# Patient Record
Sex: FEMALE | Race: WHITE | Employment: OTHER | URBAN - METROPOLITAN AREA
[De-identification: names, ages, dates, MRNs, and addresses within clinical notes are randomized per-mention and may not be internally consistent; named-entity substitution may affect disease eponyms.]

---

## 2020-02-06 ENCOUNTER — APPOINTMENT (OUTPATIENT)
Dept: CT IMAGING | Facility: CLINIC | Age: 85
End: 2020-02-06
Attending: EMERGENCY MEDICINE
Payer: COMMERCIAL

## 2020-02-06 ENCOUNTER — APPOINTMENT (OUTPATIENT)
Dept: GENERAL RADIOLOGY | Facility: CLINIC | Age: 85
End: 2020-02-06
Attending: EMERGENCY MEDICINE
Payer: COMMERCIAL

## 2020-02-06 ENCOUNTER — HOSPITAL ENCOUNTER (EMERGENCY)
Facility: CLINIC | Age: 85
Discharge: HOME | End: 2020-02-06
Attending: EMERGENCY MEDICINE | Admitting: EMERGENCY MEDICINE
Payer: COMMERCIAL

## 2020-02-06 VITALS
SYSTOLIC BLOOD PRESSURE: 160 MMHG | OXYGEN SATURATION: 96 % | TEMPERATURE: 97.4 F | HEART RATE: 79 BPM | RESPIRATION RATE: 16 BRPM | DIASTOLIC BLOOD PRESSURE: 89 MMHG

## 2020-02-06 DIAGNOSIS — S01.01XA SCALP LACERATION, INITIAL ENCOUNTER: ICD-10-CM

## 2020-02-06 DIAGNOSIS — R55 NEAR SYNCOPE: ICD-10-CM
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
## 2020-02-06 LAB
ANION GAP SERPL CALCULATED.3IONS-SCNC: 8 MMOL/L (ref 3–14)
BASOPHILS # BLD AUTO: 0 10E9/L (ref 0–0.2)
BASOPHILS NFR BLD AUTO: 0.5 %
BUN SERPL-MCNC: 12 MG/DL (ref 7–30)
CALCIUM SERPL-MCNC: 8.5 MG/DL (ref 8.5–10.1)
CHLORIDE SERPL-SCNC: 101 MMOL/L (ref 94–109)
CO2 SERPL-SCNC: 24 MMOL/L (ref 20–32)
CREAT SERPL-MCNC: 1.01 MG/DL (ref 0.52–1.04)
DIFFERENTIAL METHOD BLD: NORMAL
EOSINOPHIL # BLD AUTO: 0.1 10E9/L (ref 0–0.7)
EOSINOPHIL NFR BLD AUTO: 1.1 %
ERYTHROCYTE [DISTWIDTH] IN BLOOD BY AUTOMATED COUNT: 12.6 % (ref 10–15)
GFR SERPL CREATININE-BSD FRML MDRD: 52 ML/MIN/{1.73_M2}
GLUCOSE SERPL-MCNC: 100 MG/DL (ref 70–99)
HCT VFR BLD AUTO: 38.8 % (ref 35–47)
HGB BLD-MCNC: 12.3 G/DL (ref 11.7–15.7)
IMM GRANULOCYTES # BLD: 0 10E9/L (ref 0–0.4)
IMM GRANULOCYTES NFR BLD: 0.4 %
LYMPHOCYTES # BLD AUTO: 2.4 10E9/L (ref 0.8–5.3)
LYMPHOCYTES NFR BLD AUTO: 29.1 %
MCH RBC QN AUTO: 30.7 PG (ref 26.5–33)
MCHC RBC AUTO-ENTMCNC: 31.7 G/DL (ref 31.5–36.5)
MCV RBC AUTO: 97 FL (ref 78–100)
MONOCYTES # BLD AUTO: 0.7 10E9/L (ref 0–1.3)
MONOCYTES NFR BLD AUTO: 7.9 %
NEUTROPHILS # BLD AUTO: 5.1 10E9/L (ref 1.6–8.3)
NEUTROPHILS NFR BLD AUTO: 61 %
NRBC # BLD AUTO: 0 10*3/UL
NRBC BLD AUTO-RTO: 0 /100
PLATELET # BLD AUTO: 308 10E9/L (ref 150–450)
POTASSIUM SERPL-SCNC: 3.5 MMOL/L (ref 3.4–5.3)
RBC # BLD AUTO: 4.01 10E12/L (ref 3.8–5.2)
SODIUM SERPL-SCNC: 133 MMOL/L (ref 133–144)
TROPONIN I SERPL-MCNC: <0.015 UG/L (ref 0–0.04)
TROPONIN I SERPL-MCNC: <0.015 UG/L (ref 0–0.04)
WBC # BLD AUTO: 8.3 10E9/L (ref 4–11)

## 2020-02-06 PROCEDURE — 71046 X-RAY EXAM CHEST 2 VIEWS: CPT

## 2020-02-06 PROCEDURE — 25000132 ZZH RX MED GY IP 250 OP 250 PS 637: Performed by: EMERGENCY MEDICINE

## 2020-02-06 PROCEDURE — 93005 ELECTROCARDIOGRAM TRACING: CPT | Mod: 59

## 2020-02-06 PROCEDURE — 80048 BASIC METABOLIC PNL TOTAL CA: CPT | Performed by: EMERGENCY MEDICINE

## 2020-02-06 PROCEDURE — 85025 COMPLETE CBC W/AUTO DIFF WBC: CPT | Performed by: EMERGENCY MEDICINE

## 2020-02-06 PROCEDURE — 12001 RPR S/N/AX/GEN/TRNK 2.5CM/<: CPT

## 2020-02-06 PROCEDURE — 84484 ASSAY OF TROPONIN QUANT: CPT | Mod: 91 | Performed by: EMERGENCY MEDICINE

## 2020-02-06 PROCEDURE — 72125 CT NECK SPINE W/O DYE: CPT

## 2020-02-06 PROCEDURE — 99285 EMERGENCY DEPT VISIT HI MDM: CPT | Mod: 25

## 2020-02-06 PROCEDURE — 70450 CT HEAD/BRAIN W/O DYE: CPT

## 2020-02-06 RX ORDER — ACETAMINOPHEN 325 MG/1
650 TABLET ORAL ONCE
Status: COMPLETED | OUTPATIENT
Start: 2020-02-06 | End: 2020-02-06

## 2020-02-06 RX ORDER — BUPIVACAINE HYDROCHLORIDE 5 MG/ML
INJECTION, SOLUTION PERINEURAL
Status: DISCONTINUED
Start: 2020-02-06 | End: 2020-02-06 | Stop reason: HOSPADM

## 2020-02-06 RX ADMIN — ACETAMINOPHEN 650 MG: 325 TABLET, FILM COATED ORAL at 17:15

## 2020-02-06 ASSESSMENT — ENCOUNTER SYMPTOMS: WOUND: 1

## 2020-02-06 NOTE — ED TRIAGE NOTES
Had a few beers this morning.  Stood up from a chair, fell and struck the back of her head on the tile floor.  Large laceration to back of head.  Dressing applied at triage.  ABCDs Intact.

## 2020-02-06 NOTE — ED AVS SNAPSHOT
[facility] Emergency Department  [address]  [facility] [address]  Phone:  [phone]  Fax:  [phone]                                    Hedy English   MRN: [id number]    Department:  [facility] Emergency Department   Date of Visit:  2/6/2020           After Visit Summary Signature Page    I have received my discharge instructions, and my questions have been answered. I have discussed any challenges I see with this plan with the nurse or doctor.    ..........................................................................................................................................  Patient/Patient Representative Signature      ..........................................................................................................................................  Patient Representative Print Name and Relationship to Patient    ..................................................               ................................................  Date                                   Time    ..........................................................................................................................................  Reviewed by Signature/Title    ...................................................              ..............................................  Date                                               Time          22EPIC Rev 08/18

## 2020-02-06 NOTE — ED NOTES
"Pt on call light complaining of \"feeling like my breathing is labored.\" Lung sounds clear. O2 sats >95%. MD lowery and chest xray ordered by MD Cabello.   "

## 2020-02-06 NOTE — ED PROVIDER NOTES
History     Chief Complaint:  Fall and Head Laceration     HPI   Hedy English is a 84 year old female who presents after a fall and with a head laceration. The patient reported that she was at the [employer] having a few beers with her granddaughter this morning. The patient then stood up from her chair took a few steps forward and then fell backwards striking the back of her head on the tile floor causing a laceration to the back of her head. With concern for concussion and to close the wound, the patient was brought to the ED for evaluation.     Allergies:  The patient has no known drug allergies.    Medications:    Paxil      Past Medical History:    The patient denies any significant past medical history.    Past Surgical History:    The patient does not have any pertinent past surgical history.    Family History:    No past pertinent family history.    Social History:  Alcohol use endorsed   Negative for drug use.  Marital Status:   [2]     Review of Systems   Skin: Positive for wound.   All other systems reviewed and are negative.        Physical Exam     Patient Vitals for the past 24 hrs:   BP Temp Temp src Pulse Resp SpO2   02/06/20 1745 (!) 160/89 -- -- 79 -- --   02/06/20 1730 (!) 168/94 -- -- 80 -- --   02/06/20 1715 -- -- -- -- -- 96 %   02/06/20 1700 (!) 170/86 -- -- 76 -- 97 %   02/06/20 1645 (!) 144/77 -- -- 71 -- --   02/06/20 1615 133/69 -- -- 74 -- 95 %   02/06/20 1600 138/66 -- -- 74 -- 93 %   02/06/20 1545 136/75 -- -- 70 -- 93 %   02/06/20 1530 135/72 -- -- 75 -- 93 %   02/06/20 1515 133/75 -- -- 74 -- 95 %   02/06/20 1500 (!) 140/80 -- -- 73 -- 94 %   02/06/20 1445 (!) 153/83 -- -- 74 -- 95 %   02/06/20 1430 (!) 164/97 97.4  F (36.3  C) Oral 74 16 98 %       Physical Exam  Constitutional: Patient is well appearing. No distress.  Head: No bogginess or deformity.  See below  Mouth/Throat: Oropharynx is clear and moist. No oropharyngeal exudate.  Eyes: Conjunctivae and EOM  are normal. No scleral icterus.  Neck: Normal range of motion. Neck supple.   Cardiovascular: Normal rate, regular rhythm, normal heart sounds and intact distal pulses.   Pulmonary/Chest: Breath sounds normal. No respiratory distress.  Abdominal: Soft. Bowel sounds are normal. No distension. No tenderness. No rebound or guarding.   Musculoskeletal: Normal range of motion. No edema or tenderness.   Neurological: Alert and orientated to person, place, and time. No observable focal neuro deficit  Skin: Warm and dry. No rash noted. Not diaphoretic. 2.2 cm vertical laceration to the apex of the scalp.       Emergency Department Course   ECG:  Indication: Fall  Time: 1443  Vent. Rate 73 bpm. TX interval 210. QRS duration 100. QT/QTc 430/473. P-R-T axis 30 -15 75.  Sinus rhythm with 1st degree AV block. Incomplete right bundle branch block. Borderline ECG. Read time: 1444    Imaging:  Radiographic findings were communicated with the patient who voiced understanding of the findings.    CT Head without contrast:   1. No evidence of intracranial trauma.  2. Atrophy of the brain. White matter changes consistent with sequelae  of small vessel ischemic disease.  3. Right parietooccipital scalp soft tissue swelling and laceration  with skin staples, as per radiology.    CT Cervical Spine without contrast:   Degenerative changes. No evidence of acute trauma, as per radiology.    XR Chest PA & LAT:   No radiographic evidence of acute chest abnormality, as per radiology.       Laboratory:  CBC: WBC: 8.3, HGB: 12.3, PLT: 308  BMP: Glucose 100 (H), GFR: 52 (L), o/w WNL (Creatinine: 1.01)    1455 Troponin: <0.015  1709 Troponin: <0.015      Procedures:    Laceration Repair        LACERATION:  A simple and superficial clean 2.2 cm laceration.      LOCATION:  [place] of scalp       FUNCTION:  Distally sensation, circulation, motor and tendon function are intact.      ANESTHESIA:  Local using bupivicaine      PREPARATION:  Irrigation and  Scrubbing with Normal Saline and Shur Clens      DEBRIDEMENT:  no debridement      CLOSURE:  Wound was closed with 4 Staples    Interventions:  1715 Tylenol 650 mg PO    Emergency Department Course:  Nursing notes and vitals reviewed. (1441) I performed an exam of the patient as documented above.     IV inserted. Medicine administered as documented above. Blood drawn. This was sent to the lab for further testing, results above.    The patient was sent for head CT, C spine CT, and chest Xr while in the emergency department, findings above.   EKG obtained in the ED, see results above.     I rechecked the patient and performed the laceration repair as noted above.   (1711) I reevaluated the patient and provided an update in regards to her ED course.      Findings and plan explained to the Patient. Patient discharged home with instructions regarding supportive care, medications, and reasons to return. The importance of close follow-up was reviewed.     I personally reviewed the laboratory results with the Patient and answered all related questions prior to discharge.       Impression & Plan    Medical Decision Making:  This patient presents after a fall from standing, and has a history/exam consistent with a closed head injury with laceration. Differential diagnosis is broad, and it includes skull fracture, epidural hematoma, subdural hematoma, intracerebral hemorrhage, and traumatic subarachnoid hemorrhage, all of which are highly unlikely in this clinical setting. This patient denies severe headache, seizure, and has no focal neurological findings. The patient did not have  LOC, sleepiness, repeated emesis, poor orientation, or significant irritability. CT head as above    The patient's laceration was cleaned, evaluated, and repaired, as noted above. Risks/signs of infection and scarring have been discussed with the patient and family. Staples should be removed in 5 days to limit the chances of scarring. The patient  will watch for signs of infection, including erythema surrounding the wound, streaking, purulent drainage, or fever/chills.     Additionally vasovagal most likely post 6 beers and rapidly standing up however an extensive workup here for other neuro CV resp causes neg as above.      All questions and concerns were answered. The patient was discharged home and recommended to follow up with her primary physician and return with any new or worsening symptoms.     Diagnosis:    ICD-10-CM    1. Fall, initial encounter W19.XXXA Troponin I   2. Near syncope R55    3. Scalp laceration, initial encounter S01.01XA        Disposition:  discharged to home    Scribe Disclosure:  Nohemy KEBEDE, am serving as a scribe on 2/6/2020 at 2:41 PM to personally document services performed by Rob Cabello MD based on my observations and the provider's statements to me.     Nohemy Lechuga  2/6/2020   [facility] EMERGENCY DEPARTMENT       Rob Cabello MD  02/06/20 6750

## 2020-02-07 LAB — INTERPRETATION ECG - MUSE: NORMAL

## 2020-09-20 ENCOUNTER — APPOINTMENT (OUTPATIENT)
Dept: CT IMAGING | Facility: CLINIC | Age: 85
End: 2020-09-20
Attending: EMERGENCY MEDICINE
Payer: COMMERCIAL

## 2020-09-20 ENCOUNTER — APPOINTMENT (OUTPATIENT)
Dept: GENERAL RADIOLOGY | Facility: CLINIC | Age: 85
End: 2020-09-20
Attending: EMERGENCY MEDICINE
Payer: COMMERCIAL

## 2020-09-20 ENCOUNTER — HOSPITAL ENCOUNTER (EMERGENCY)
Facility: CLINIC | Age: 85
Discharge: HOME | End: 2020-09-20
Attending: EMERGENCY MEDICINE | Admitting: EMERGENCY MEDICINE
Payer: COMMERCIAL

## 2020-09-20 VITALS
OXYGEN SATURATION: 97 % | RESPIRATION RATE: 19 BRPM | SYSTOLIC BLOOD PRESSURE: 198 MMHG | HEART RATE: 84 BPM | DIASTOLIC BLOOD PRESSURE: 105 MMHG | TEMPERATURE: 98.5 F

## 2020-09-20 DIAGNOSIS — I10 HYPERTENSION, UNSPECIFIED TYPE: ICD-10-CM

## 2020-09-20 DIAGNOSIS — R06.02 SHORTNESS OF BREATH: ICD-10-CM

## 2020-09-20 LAB
ANION GAP SERPL CALCULATED.3IONS-SCNC: 7 MMOL/L (ref 3–14)
BASOPHILS # BLD AUTO: 0.1 10E9/L (ref 0–0.2)
BASOPHILS NFR BLD AUTO: 0.6 %
BUN SERPL-MCNC: 25 MG/DL (ref 7–30)
CALCIUM SERPL-MCNC: 9.6 MG/DL (ref 8.5–10.1)
CHLORIDE SERPL-SCNC: 104 MMOL/L (ref 94–109)
CO2 SERPL-SCNC: 25 MMOL/L (ref 20–32)
CREAT SERPL-MCNC: 0.88 MG/DL (ref 0.52–1.04)
D DIMER PPP FEU-MCNC: 1.1 UG/ML FEU (ref 0–0.5)
DIFFERENTIAL METHOD BLD: ABNORMAL
EOSINOPHIL # BLD AUTO: 0.1 10E9/L (ref 0–0.7)
EOSINOPHIL NFR BLD AUTO: 1.3 %
ERYTHROCYTE [DISTWIDTH] IN BLOOD BY AUTOMATED COUNT: 13.2 % (ref 10–15)
GFR SERPL CREATININE-BSD FRML MDRD: 61 ML/MIN/{1.73_M2}
GLUCOSE SERPL-MCNC: 118 MG/DL (ref 70–99)
HCT VFR BLD AUTO: 38.7 % (ref 35–47)
HGB BLD-MCNC: 12.1 G/DL (ref 11.7–15.7)
IMM GRANULOCYTES # BLD: 0.1 10E9/L (ref 0–0.4)
IMM GRANULOCYTES NFR BLD: 0.5 %
LYMPHOCYTES # BLD AUTO: 2.7 10E9/L (ref 0.8–5.3)
LYMPHOCYTES NFR BLD AUTO: 25.1 %
MCH RBC QN AUTO: 30.1 PG (ref 26.5–33)
MCHC RBC AUTO-ENTMCNC: 31.3 G/DL (ref 31.5–36.5)
MCV RBC AUTO: 96 FL (ref 78–100)
MONOCYTES # BLD AUTO: 0.8 10E9/L (ref 0–1.3)
MONOCYTES NFR BLD AUTO: 7.8 %
NEUTROPHILS # BLD AUTO: 7 10E9/L (ref 1.6–8.3)
NEUTROPHILS NFR BLD AUTO: 64.7 %
NRBC # BLD AUTO: 0 10*3/UL
NRBC BLD AUTO-RTO: 0 /100
PLATELET # BLD AUTO: 358 10E9/L (ref 150–450)
POTASSIUM SERPL-SCNC: 3.9 MMOL/L (ref 3.4–5.3)
RBC # BLD AUTO: 4.02 10E12/L (ref 3.8–5.2)
SODIUM SERPL-SCNC: 136 MMOL/L (ref 133–144)
TROPONIN I SERPL-MCNC: <0.015 UG/L (ref 0–0.04)
WBC # BLD AUTO: 10.8 10E9/L (ref 4–11)

## 2020-09-20 PROCEDURE — 71045 X-RAY EXAM CHEST 1 VIEW: CPT

## 2020-09-20 PROCEDURE — 80048 BASIC METABOLIC PNL TOTAL CA: CPT | Performed by: EMERGENCY MEDICINE

## 2020-09-20 PROCEDURE — 96374 THER/PROPH/DIAG INJ IV PUSH: CPT | Mod: 59

## 2020-09-20 PROCEDURE — 25000128 H RX IP 250 OP 636: Performed by: EMERGENCY MEDICINE

## 2020-09-20 PROCEDURE — 71275 CT ANGIOGRAPHY CHEST: CPT

## 2020-09-20 PROCEDURE — 99285 EMERGENCY DEPT VISIT HI MDM: CPT | Mod: 25

## 2020-09-20 PROCEDURE — 25000132 ZZH RX MED GY IP 250 OP 250 PS 637: Performed by: EMERGENCY MEDICINE

## 2020-09-20 PROCEDURE — 84484 ASSAY OF TROPONIN QUANT: CPT | Performed by: EMERGENCY MEDICINE

## 2020-09-20 PROCEDURE — 93005 ELECTROCARDIOGRAM TRACING: CPT

## 2020-09-20 PROCEDURE — 85379 FIBRIN DEGRADATION QUANT: CPT | Performed by: EMERGENCY MEDICINE

## 2020-09-20 PROCEDURE — 85025 COMPLETE CBC W/AUTO DIFF WBC: CPT | Performed by: EMERGENCY MEDICINE

## 2020-09-20 RX ORDER — HYDROCHLOROTHIAZIDE 12.5 MG/1
12.5 TABLET ORAL DAILY
Qty: 30 TABLET | Refills: 0 | Status: SHIPPED | OUTPATIENT
Start: 2020-09-20 | End: 2021-09-06

## 2020-09-20 RX ORDER — HYDRALAZINE HYDROCHLORIDE 20 MG/ML
5 INJECTION INTRAMUSCULAR; INTRAVENOUS ONCE
Status: COMPLETED | OUTPATIENT
Start: 2020-09-20 | End: 2020-09-20

## 2020-09-20 RX ORDER — HYDROCHLOROTHIAZIDE 12.5 MG/1
12.5 CAPSULE ORAL ONCE
Status: COMPLETED | OUTPATIENT
Start: 2020-09-20 | End: 2020-09-20

## 2020-09-20 RX ORDER — IOPAMIDOL 755 MG/ML
60 INJECTION, SOLUTION INTRAVASCULAR ONCE
Status: COMPLETED | OUTPATIENT
Start: 2020-09-20 | End: 2020-09-20

## 2020-09-20 RX ADMIN — HYDRALAZINE HYDROCHLORIDE 5 MG: 20 INJECTION INTRAMUSCULAR; INTRAVENOUS at 18:55

## 2020-09-20 RX ADMIN — IOPAMIDOL 60 ML: 755 INJECTION, SOLUTION INTRAVENOUS at 18:18

## 2020-09-20 RX ADMIN — HYDROCHLOROTHIAZIDE 12.5 MG: 12.5 CAPSULE ORAL at 17:39

## 2020-09-20 NOTE — ED AVS SNAPSHOT
[facility] Emergency Department  [address]  [facility] [address]  Phone:  [phone]  Fax:  [phone]                                    Hedy English   MRN: [id number]    Department:  [facility] Emergency Department   Date of Visit:  9/20/2020           After Visit Summary Signature Page    I have received my discharge instructions, and my questions have been answered. I have discussed any challenges I see with this plan with the nurse or doctor.    ..........................................................................................................................................  Patient/Patient Representative Signature      ..........................................................................................................................................  Patient Representative Print Name and Relationship to Patient    ..................................................               ................................................  Date                                   Time    ..........................................................................................................................................  Reviewed by Signature/Title    ...................................................              ..............................................  Date                                               Time          22EPIC Rev 08/18

## 2020-09-20 NOTE — ED PROVIDER NOTES
History     Chief Complaint:  Shortness of Breath and Hypertension    HPI   Hedy English is a 84 year old female who presents with tautness of breath.  Patient reported that during the night she had a few episodes of shortness of breath.  She felt like she was sleeping and it was a dream.  She denies any chest pain.  She woke up and with her about her day with no symptoms.  She has never had this happen before.  She denies any current chest pain or leg swelling.  She does have a history of high blood pressure but does not take any antihypertensives as she was never prescribed them.  She denies any headaches.  She denies any fever or cough.    Allergies:  No Known Allergies     Medications:    Past medical history reviewed. No pertinent medical history.    Past Medical History:    Dyslipidemia  Glaucoma  Depression with anxiety  Diverticulitis of colon  Osteopenia  Hyperglycemia  Vitamin D deficiency  Cholelithiasis    Past Surgical History:    Cataract removal  Right eye glaucoma x2    Family History:    Father: Heart disease, Hypertension  Mother: Hypertension    Social History:  Smoking Status: Former Smoker  Smokeless Tobacco: Never Used  Alcohol Use: Positive  Drug Use: Negative  PCP: Sparkle Mcallister  Marital Status:       Review of Systems   Constitutional: Negative for fever.   Respiratory: Positive for shortness of breath. Negative for cough.    Cardiovascular: Negative for chest pain.   Neurological: Negative for headaches.   All other systems reviewed and are negative.    Physical Exam     Patient Vitals for the past 24 hrs:   BP Temp Pulse Resp SpO2   09/20/20 1955 -- -- 84 19 97 %   09/20/20 1954 -- -- 82 22 97 %   09/20/20 1945 (!) 198/105 -- 82 17 98 %   09/20/20 1933 (!) 181/106 -- 85 -- --   09/20/20 1930 (!) 192/105 -- 87 26 99 %   09/20/20 1915 -- -- 90 19 96 %   09/20/20 1900 (!) 199/100 -- 83 25 95 %   09/20/20 1845 (!) 205/102 -- 82 26 96 %   09/20/20 1745 (!) 225/105 -- 78 25 --    09/20/20 1730 (!) 219/106 -- 80 20 95 %   09/20/20 1715 (!) 211/111 -- 81 19 97 %   09/20/20 1700 (!) 206/111 -- 82 14 96 %   09/20/20 1645 (!) 219/109 -- 80 16 98 %   09/20/20 1630 (!) 215/110 -- -- -- --   09/20/20 1622 (!) 221/129 98.5  F (36.9  C) 86 16 99 %       Physical Exam  General: Patient is alert and interactive when I enter the room, well appearing   Head:  The scalp, face, and head appear normal  Eyes:  Conjunctivae are normal  ENT:    The nose is normal    Pinnae are normal    External acoustic canals are normal  Neck:  Trachea midline  CV:  Pulses are normal, RRR    Resp:  No respiratory distress, CTAB   Abdomen:      Soft, non-tender, non-distended  Musc:  Normal muscular tone    No major joint effusions    No asymmetric leg swelling    No lower extremity edema   Skin:  No rash or lesions noted  Neuro:  Speech is normal and fluent. Face is symmetric.     Moving all extremities well.   Psych: Awake. Alert.  Normal affect.  Appropriate interactions.      Emergency Department Course     ECG:  ECG taken at 1636  Sinus rhythm with Premature atrial complexes with Aberrant conduction  Otherwise normal ECG  When compared with ECG of 06-FEB-2020 14:43,  Aberrant conduction is now Present  Rate 81 bpm. SC interval 194 ms. QRS duration 98 ms. QT/QTc 404/469 ms. P-R-T axes 51 -27 54.    Imaging:  Radiology findings were communicated with the patient who voiced understanding of the findings.    XR Chest Port 1 View  No pleural fluid or pneumothorax. A retrocardiac opacity could be a confluence of soft tissues. No large consolidative opacity identified. No interstitial edema. The cardiomediastinal silhouette is normal in size. There is aortic   calcification.   Reading per radiology    CT Chest Pulmonary Embolism w Contrast  1.  No PE.  2.  Minimal nodularity in the right upper lobe is likely inflammatory change given adjacent mucous plugging. A 2 mm right lower lobe nodule may also be inflammatory change.  3.   Cardiomegaly.  4.  Atherosclerotic disease including coronary artery disease.   Reading per radiology    Laboratory:  Laboratory findings were communicated with the patient who voiced understanding of the findings.    CBC: WBC 10.8, HGB 12.1,   BMP: Glucose 118 (H) o/w WNL (Creatinine 0.88)    D dimer quantitative: 1.1 (H)    Troponin (Collected 1629): <0.015    Interventions:  1739 Microzide 12.5mg PO  1855 Apresoline 5mg IV    Emergency Department Course:    Past medical records, nursing notes, and vitals reviewed.  I performed an exam of the patient and obtained history, as documented above.     IV was inserted and blood was drawn for laboratory testing, results above.    The patient was sent for a CT and XR while in the emergency department, findings above    I rechecked and updated the patient.     I personally reviewed the laboratory and imaging results with the Patient and answered all related questions prior to discharge.     Findings and plan explained to the Patient. Patient discharged home with instructions regarding supportive care, medications, and reasons to return. The importance of close follow-up was reviewed.     Impression & Plan      Medical Decision Making:  Hedy English is a 84 year old female who presents to the emergency department today for evaluation of an episode of sob and HTN.  Patient presents with an episode of shortness of breath overnight.  She was sleeping and she is not sure if it was a dream.  She otherwise appears asymptomatic and is been fine all day.  Her EKG here was unremarkable.  Blood work here was unremarkable.  She is hypertensive which she admits that she has been told she has high blood pressure before but she is never been started on medications.  She is asymptomatic from this hypertension.  We did give her hydrochlorothiazide and hydralazine IV and her pressures did improve.  She started out to 20 and we are able to get her at 180 which I feel like is a  reasonable enough drop and I would not want a lower her blood pressure anymore.  She did have an elevated d-dimer so we did do a CT PE study but this revealed no acute abnormality.  I will start patient on hydrochlorothiazide and have her follow-up with her primary to get a blood pressure recheck.  She otherwise has no other symptoms so I think she is safe for discharge.  Return precautions given      Diagnosis:    ICD-10-CM    1. Shortness of breath  R06.02    2. Hypertension, unspecified type  I10        Disposition:   The patient is discharged to home.    Discharge Medications:  New Prescriptions    HYDROCHLOROTHIAZIDE (HYDRODIURIL) 12.5 MG TABLET    Take 1 tablet (12.5 mg) by mouth daily       Scribe Disclosure:  I, Bill Jama, am serving as a scribe at 4:41 PM on 9/20/2020 to document services personally performed by Lashae Amezcua MD based on my observations and the provider's statements to me.`   [facility] EMERGENCY DEPARTMENT       Lashae Amezcua MD  09/21/20 [id number]

## 2020-09-20 NOTE — ED TRIAGE NOTES
Sent to the ED from urgent care following an episode of SOB last night. Patient states is not currently feeling SOB. Was noted to be hypertensive at clinic with /100. Patient states is not on any antihypertensive medications.

## 2020-09-21 LAB — INTERPRETATION ECG - MUSE: NORMAL

## 2020-09-21 ASSESSMENT — ENCOUNTER SYMPTOMS
COUGH: 0
FEVER: 0
HEADACHES: 0
SHORTNESS OF BREATH: 1

## 2021-09-06 ENCOUNTER — APPOINTMENT (OUTPATIENT)
Dept: GENERAL RADIOLOGY | Facility: CLINIC | Age: 86
DRG: 689 | End: 2021-09-06
Attending: EMERGENCY MEDICINE
Payer: COMMERCIAL

## 2021-09-06 ENCOUNTER — HOSPITAL ENCOUNTER (INPATIENT)
Facility: CLINIC | Age: 86
LOS: 2 days | Discharge: NURSING, REHAB OR LONG-TERM CARE | DRG: 689 | End: 2021-09-08
Attending: EMERGENCY MEDICINE | Admitting: INTERNAL MEDICINE
Payer: COMMERCIAL

## 2021-09-06 DIAGNOSIS — N39.0 UTI (URINARY TRACT INFECTION) WITH PYURIA: ICD-10-CM

## 2021-09-06 DIAGNOSIS — R79.89 ELEVATED LACTIC ACID LEVEL: ICD-10-CM

## 2021-09-06 DIAGNOSIS — M62.81 GENERALIZED MUSCLE WEAKNESS: ICD-10-CM

## 2021-09-06 DIAGNOSIS — R94.31 ABNORMAL ELECTROCARDIOGRAM: ICD-10-CM

## 2021-09-06 LAB
ALBUMIN UR-MCNC: 200 MG/DL
ANION GAP SERPL CALCULATED.3IONS-SCNC: 9 MMOL/L (ref 3–14)
APPEARANCE UR: ABNORMAL
BACTERIA #/AREA URNS HPF: ABNORMAL /HPF
BASE EXCESS BLDV CALC-SCNC: 0 MMOL/L (ref -7.7–1.9)
BASOPHILS # BLD AUTO: 0 10E3/UL (ref 0–0.2)
BASOPHILS NFR BLD AUTO: 0 %
BILIRUB UR QL STRIP: ABNORMAL
BUN SERPL-MCNC: 20 MG/DL (ref 7–30)
CALCIUM SERPL-MCNC: 8.9 MG/DL (ref 8.5–10.1)
CHLORIDE BLD-SCNC: 104 MMOL/L (ref 94–109)
CO2 SERPL-SCNC: 24 MMOL/L (ref 20–32)
COLOR UR AUTO: ABNORMAL
CREAT SERPL-MCNC: 0.92 MG/DL (ref 0.52–1.04)
EOSINOPHIL # BLD AUTO: 0.1 10E3/UL (ref 0–0.7)
EOSINOPHIL NFR BLD AUTO: 1 %
ERYTHROCYTE [DISTWIDTH] IN BLOOD BY AUTOMATED COUNT: 19.4 % (ref 10–15)
GFR SERPL CREATININE-BSD FRML MDRD: 58 ML/MIN/1.73M2
GLUCOSE BLD-MCNC: 159 MG/DL (ref 70–99)
GLUCOSE UR STRIP-MCNC: NEGATIVE MG/DL
HCO3 BLDV-SCNC: 25 MMOL/L (ref 21–28)
HCT VFR BLD AUTO: 41.3 % (ref 35–47)
HGB BLD-MCNC: 13.7 G/DL (ref 11.7–15.7)
HGB UR QL STRIP: ABNORMAL
HOLD SPECIMEN: NORMAL
IMM GRANULOCYTES # BLD: 0.1 10E3/UL
IMM GRANULOCYTES NFR BLD: 1 %
KETONES UR STRIP-MCNC: ABNORMAL MG/DL
LACTATE SERPL-SCNC: 2.6 MMOL/L (ref 0.7–2)
LEUKOCYTE ESTERASE UR QL STRIP: ABNORMAL
LYMPHOCYTES # BLD AUTO: 1.8 10E3/UL (ref 0.8–5.3)
LYMPHOCYTES NFR BLD AUTO: 19 %
MAGNESIUM SERPL-MCNC: 1.7 MG/DL (ref 1.6–2.3)
MCH RBC QN AUTO: 32.4 PG (ref 26.5–33)
MCHC RBC AUTO-ENTMCNC: 33.2 G/DL (ref 31.5–36.5)
MCV RBC AUTO: 98 FL (ref 78–100)
MONOCYTES # BLD AUTO: 0.6 10E3/UL (ref 0–1.3)
MONOCYTES NFR BLD AUTO: 7 %
NEUTROPHILS # BLD AUTO: 6.7 10E3/UL (ref 1.6–8.3)
NEUTROPHILS NFR BLD AUTO: 72 %
NITRATE UR QL: NEGATIVE
NRBC # BLD AUTO: 0.1 10E3/UL
NRBC BLD AUTO-RTO: 1 /100
O2/TOTAL GAS SETTING VFR VENT: 0 %
PCO2 BLDV: 39 MM HG (ref 40–50)
PH BLDV: 7.41 [PH] (ref 7.32–7.43)
PH UR STRIP: 7.5 [PH] (ref 5–7)
PLATELET # BLD AUTO: 306 10E3/UL (ref 150–450)
PO2 BLDV: 15 MM HG (ref 25–47)
POTASSIUM BLD-SCNC: 4 MMOL/L (ref 3.4–5.3)
RBC # BLD AUTO: 4.23 10E6/UL (ref 3.8–5.2)
RBC URINE: 42 /HPF
SARS-COV-2 RNA RESP QL NAA+PROBE: NEGATIVE
SODIUM SERPL-SCNC: 137 MMOL/L (ref 133–144)
SP GR UR STRIP: 1.03 (ref 1–1.03)
TROPONIN I SERPL-MCNC: <0.015 UG/L (ref 0–0.04)
TSH SERPL DL<=0.005 MIU/L-ACNC: 1.81 MU/L (ref 0.4–4)
UROBILINOGEN UR STRIP-MCNC: 4 MG/DL
WBC # BLD AUTO: 9.5 10E3/UL (ref 4–11)
WBC URINE: >182 /HPF

## 2021-09-06 PROCEDURE — 96365 THER/PROPH/DIAG IV INF INIT: CPT

## 2021-09-06 PROCEDURE — 87635 SARS-COV-2 COVID-19 AMP PRB: CPT | Performed by: EMERGENCY MEDICINE

## 2021-09-06 PROCEDURE — 250N000011 HC RX IP 250 OP 636: Performed by: INTERNAL MEDICINE

## 2021-09-06 PROCEDURE — 120N000001 HC R&B MED SURG/OB

## 2021-09-06 PROCEDURE — 250N000011 HC RX IP 250 OP 636: Performed by: EMERGENCY MEDICINE

## 2021-09-06 PROCEDURE — 84443 ASSAY THYROID STIM HORMONE: CPT | Performed by: INTERNAL MEDICINE

## 2021-09-06 PROCEDURE — 99285 EMERGENCY DEPT VISIT HI MDM: CPT | Mod: 25

## 2021-09-06 PROCEDURE — 99223 1ST HOSP IP/OBS HIGH 75: CPT | Mod: AI | Performed by: INTERNAL MEDICINE

## 2021-09-06 PROCEDURE — 80048 BASIC METABOLIC PNL TOTAL CA: CPT | Performed by: EMERGENCY MEDICINE

## 2021-09-06 PROCEDURE — 36415 COLL VENOUS BLD VENIPUNCTURE: CPT | Performed by: EMERGENCY MEDICINE

## 2021-09-06 PROCEDURE — 83735 ASSAY OF MAGNESIUM: CPT | Performed by: EMERGENCY MEDICINE

## 2021-09-06 PROCEDURE — 81001 URINALYSIS AUTO W/SCOPE: CPT | Performed by: EMERGENCY MEDICINE

## 2021-09-06 PROCEDURE — 84484 ASSAY OF TROPONIN QUANT: CPT | Performed by: EMERGENCY MEDICINE

## 2021-09-06 PROCEDURE — C9803 HOPD COVID-19 SPEC COLLECT: HCPCS

## 2021-09-06 PROCEDURE — 250N000013 HC RX MED GY IP 250 OP 250 PS 637: Performed by: INTERNAL MEDICINE

## 2021-09-06 PROCEDURE — 82803 BLOOD GASES ANY COMBINATION: CPT | Performed by: EMERGENCY MEDICINE

## 2021-09-06 PROCEDURE — 93005 ELECTROCARDIOGRAM TRACING: CPT

## 2021-09-06 PROCEDURE — 87086 URINE CULTURE/COLONY COUNT: CPT | Performed by: INTERNAL MEDICINE

## 2021-09-06 PROCEDURE — 258N000003 HC RX IP 258 OP 636: Performed by: INTERNAL MEDICINE

## 2021-09-06 PROCEDURE — 87040 BLOOD CULTURE FOR BACTERIA: CPT | Performed by: EMERGENCY MEDICINE

## 2021-09-06 PROCEDURE — 250N000013 HC RX MED GY IP 250 OP 250 PS 637: Performed by: EMERGENCY MEDICINE

## 2021-09-06 PROCEDURE — 83605 ASSAY OF LACTIC ACID: CPT | Performed by: EMERGENCY MEDICINE

## 2021-09-06 PROCEDURE — 258N000003 HC RX IP 258 OP 636: Performed by: EMERGENCY MEDICINE

## 2021-09-06 PROCEDURE — 85025 COMPLETE CBC W/AUTO DIFF WBC: CPT | Performed by: EMERGENCY MEDICINE

## 2021-09-06 PROCEDURE — 71045 X-RAY EXAM CHEST 1 VIEW: CPT

## 2021-09-06 RX ORDER — AMOXICILLIN 250 MG
2 CAPSULE ORAL 2 TIMES DAILY PRN
Status: DISCONTINUED | OUTPATIENT
Start: 2021-09-06 | End: 2021-09-08 | Stop reason: HOSPADM

## 2021-09-06 RX ORDER — ONDANSETRON 4 MG/1
4 TABLET, ORALLY DISINTEGRATING ORAL EVERY 6 HOURS PRN
Status: DISCONTINUED | OUTPATIENT
Start: 2021-09-06 | End: 2021-09-08 | Stop reason: HOSPADM

## 2021-09-06 RX ORDER — CEFTRIAXONE 1 G/1
1 INJECTION, POWDER, FOR SOLUTION INTRAMUSCULAR; INTRAVENOUS ONCE
Status: COMPLETED | OUTPATIENT
Start: 2021-09-06 | End: 2021-09-06

## 2021-09-06 RX ORDER — AMOXICILLIN 250 MG
1 CAPSULE ORAL 2 TIMES DAILY PRN
Status: DISCONTINUED | OUTPATIENT
Start: 2021-09-06 | End: 2021-09-08 | Stop reason: HOSPADM

## 2021-09-06 RX ORDER — ACETAMINOPHEN 325 MG/1
650 TABLET ORAL EVERY 6 HOURS PRN
Status: DISCONTINUED | OUTPATIENT
Start: 2021-09-06 | End: 2021-09-08 | Stop reason: HOSPADM

## 2021-09-06 RX ORDER — ONDANSETRON 2 MG/ML
4 INJECTION INTRAMUSCULAR; INTRAVENOUS EVERY 6 HOURS PRN
Status: DISCONTINUED | OUTPATIENT
Start: 2021-09-06 | End: 2021-09-08 | Stop reason: HOSPADM

## 2021-09-06 RX ORDER — LIDOCAINE 40 MG/G
CREAM TOPICAL
Status: DISCONTINUED | OUTPATIENT
Start: 2021-09-06 | End: 2021-09-06

## 2021-09-06 RX ORDER — ASPIRIN 325 MG
325 TABLET ORAL ONCE
Status: COMPLETED | OUTPATIENT
Start: 2021-09-06 | End: 2021-09-06

## 2021-09-06 RX ORDER — CEFTRIAXONE 2 G/1
2 INJECTION, POWDER, FOR SOLUTION INTRAMUSCULAR; INTRAVENOUS EVERY 24 HOURS
Status: DISCONTINUED | OUTPATIENT
Start: 2021-09-07 | End: 2021-09-08 | Stop reason: HOSPADM

## 2021-09-06 RX ORDER — SODIUM CHLORIDE 9 MG/ML
INJECTION, SOLUTION INTRAVENOUS CONTINUOUS
Status: ACTIVE | OUTPATIENT
Start: 2021-09-06 | End: 2021-09-07

## 2021-09-06 RX ORDER — PAROXETINE 20 MG/1
20 TABLET, FILM COATED ORAL DAILY
COMMUNITY
Start: 2021-05-21

## 2021-09-06 RX ORDER — ACETAMINOPHEN 650 MG/1
650 SUPPOSITORY RECTAL EVERY 6 HOURS PRN
Status: DISCONTINUED | OUTPATIENT
Start: 2021-09-06 | End: 2021-09-08 | Stop reason: HOSPADM

## 2021-09-06 RX ORDER — LIDOCAINE 40 MG/G
CREAM TOPICAL
Status: DISCONTINUED | OUTPATIENT
Start: 2021-09-06 | End: 2021-09-08 | Stop reason: HOSPADM

## 2021-09-06 RX ORDER — DORZOLAMIDE HYDROCHLORIDE AND TIMOLOL MALEATE 20; 5 MG/ML; MG/ML
1 SOLUTION/ DROPS OPHTHALMIC 2 TIMES DAILY
Status: DISCONTINUED | OUTPATIENT
Start: 2021-09-06 | End: 2021-09-08 | Stop reason: HOSPADM

## 2021-09-06 RX ORDER — PAROXETINE 20 MG/1
20 TABLET, FILM COATED ORAL DAILY
Status: DISCONTINUED | OUTPATIENT
Start: 2021-09-07 | End: 2021-09-08 | Stop reason: HOSPADM

## 2021-09-06 RX ORDER — DORZOLAMIDE HYDROCHLORIDE AND TIMOLOL MALEATE 20; 5 MG/ML; MG/ML
SOLUTION/ DROPS OPHTHALMIC
COMMUNITY
Start: 2021-07-30

## 2021-09-06 RX ADMIN — CEFTRIAXONE 1 G: 1 INJECTION, POWDER, FOR SOLUTION INTRAMUSCULAR; INTRAVENOUS at 18:14

## 2021-09-06 RX ADMIN — SODIUM CHLORIDE: 9 INJECTION, SOLUTION INTRAVENOUS at 18:13

## 2021-09-06 RX ADMIN — CEFTRIAXONE 1 G: 1 INJECTION, POWDER, FOR SOLUTION INTRAMUSCULAR; INTRAVENOUS at 16:07

## 2021-09-06 RX ADMIN — ASPIRIN 325 MG ORAL TABLET 325 MG: 325 PILL ORAL at 15:43

## 2021-09-06 RX ADMIN — DORZOLAMIDE HYDROCHLORIDE AND TIMOLOL MALEATE 1 DROP: 22.3; 6.8 SOLUTION/ DROPS OPHTHALMIC at 21:03

## 2021-09-06 RX ADMIN — SODIUM CHLORIDE 1000 ML: 9 INJECTION, SOLUTION INTRAVENOUS at 15:43

## 2021-09-06 ASSESSMENT — ENCOUNTER SYMPTOMS
FEVER: 0
NAUSEA: 1
ABDOMINAL PAIN: 0
BACK PAIN: 0
SHORTNESS OF BREATH: 0
APPETITE CHANGE: 1
FATIGUE: 1
CHILLS: 0
WHEEZING: 0
WEAKNESS: 1

## 2021-09-06 ASSESSMENT — MIFFLIN-ST. JEOR: SCORE: 982.05

## 2021-09-06 ASSESSMENT — ACTIVITIES OF DAILY LIVING (ADL): ADLS_ACUITY_SCORE: 26

## 2021-09-06 NOTE — ED TRIAGE NOTES
Presents to ED via EMS for generalized weakness and fatigue for a couple weeks. Patient has no complaints, other than being achy when walking. Pt was 91% on RA, so EMS placed 2L via NC. Daughter states patient's only medical hx is mental health. HR 100s. . ABCs intact. Pt A&OX4.

## 2021-09-06 NOTE — ED NOTES
"[facility]  ED Nurse Handoff Report    Hedy English is a 85 year old female   ED Chief complaint: Generalized Weakness  . ED Diagnosis:   Final diagnoses:   Generalized muscle weakness   UTI (urinary tract infection) with pyuria   Elevated lactic acid level   Abnormal electrocardiogram     Allergies: No Known Allergies    Code Status: Full Code  Activity level - Baseline/Home:  Assist X 1. Activity Level - Current:   Assist X 1. Lift room needed: No. Bariatric: No   Needed: No   Isolation: No. Infection: Not Applicable.     Vital Signs:   Vitals:    09/06/21 1430 09/06/21 1445 09/06/21 1500 09/06/21 1515   BP: 107/82 112/80 91/65 111/76   Pulse: 96 100 102 88   Resp:       Temp:       TempSrc:       SpO2: 95% 96%  92%       Cardiac Rhythm:  ,      Pain level:    Patient confused: No. Patient Falls Risk: Yes.   Elimination Status: Has voided - straight cath for UA. Pt normally incontinent.   Patient Report - Initial Complaint: Generalized weakness. Focused Assessment: 85 year old female, with a history of hypertension, who presents to the ED for evaluation of generalized weakness. The patient's daughter reports increase in generalized weakness and fatigue over the last couple weeks. Today, patient told daughter that she was feeling \"woozy\". Daughter reports Hedy does not walk or stand. When she urinates, she uses a diaper. There has been an odor to her urine recently. She is not eating much because she is not hungry. She is drinking fine. She is not normally on oxygen. Denies breathing trouble, pain, cough, fever, chills.      Review of Systems   Constitutional: Positive for appetite change and fatigue. Negative for chills and fever.   Respiratory: Negative for shortness of breath and wheezing.    Cardiovascular: Negative for chest pain.   Gastrointestinal: Positive for nausea. Negative for abdominal pain.   Genitourinary:        (+) urinary odor   Musculoskeletal: Positive for gait " problem. Negative for back pain.   Neurological: Positive for weakness.   All other systems reviewed and are negative.     Tests Performed: Labs, xray, EKG, urine. Abnormal Results:   Labs Ordered and Resulted from Time of ED Arrival Up to the Time of Departure from the ED   BASIC METABOLIC PANEL - Abnormal; Notable for the following components:       Result Value    Glucose 159 (*)     GFR Estimate 58 (*)     All other components within normal limits   LACTIC ACID WHOLE BLOOD - Abnormal; Notable for the following components:    Lactic Acid 2.6 (*)     All other components within normal limits   BLOOD GAS VENOUS - Abnormal; Notable for the following components:    pCO2 Venous 39 (*)     pO2 Venous 15 (*)     All other components within normal limits   ROUTINE UA WITH MICROSCOPIC - Abnormal; Notable for the following components:    Color Urine Dark Yellow (*)     Appearance Urine Cloudy (*)     Bilirubin Urine Small (*)     Ketones Urine Trace (*)     Blood Urine Small (*)     pH Urine 7.5 (*)     Protein Albumin Urine 200  (*)     Urobilinogen Urine 4.0 (*)     Leukocyte Esterase Urine Large (*)     Bacteria Urine Many (*)     RBC Urine 42 (*)     WBC Urine >182 (*)     All other components within normal limits   CBC WITH PLATELETS AND DIFFERENTIAL - Abnormal; Notable for the following components:    RDW 19.4 (*)     NRBCs per 100 WBC 1 (*)     Absolute Immature Granulocytes 0.1 (*)     All other components within normal limits   MAGNESIUM - Normal   TROPONIN I - Normal   COVID-19 VIRUS (CORONAVIRUS) BY PCR - Normal    Narrative:     Testing was performed using the johanna  SARS-CoV-2 & Influenza A/B Assay on the johanna  Rajani  System.  This test should be ordered for the detection of SARS-COV-2 in individuals who meet SARS-CoV-2 clinical and/or epidemiological criteria. Test performance is unknown in asymptomatic patients.  This test is for in vitro diagnostic use under the FDA EUA for laboratories certified under CLIA  to perform moderate and/or high complexity testing. This test has not been FDA cleared or approved.  A negative test does not rule out the presence of PCR inhibitors in the specimen or target RNA in concentration below the limit of detection for the assay. The possibility of a false negative should be considered if the patient's recent exposure or clinical presentation suggests COVID-19.  [facility] [employer] are certified under the Clinical Laboratory Improvement Amendments of 1988 (CLIA-88) as qualified to perform moderate and/or high complexity laboratory testing.   CBC WITH PLATELETS & DIFFERENTIAL    Narrative:     The following orders were created for panel order CBC with platelets differential.  Procedure                               Abnormality         Status                     ---------                               -----------         ------                     CBC with platelets and d...[[id number]]  Abnormal            Final result                 Please view results for these tests on the individual orders.   EXTRA BLUE TOP TUBE   EXTRA RED TOP TUBE   EXTRA GREEN TOP (LITHIUM HEPARIN) TUBE   PULSE OXIMETRY NURSING   PERIPHERAL IV CATHETER   CARDIAC CONTINUOUS MONITORING   BLOOD CULTURE   BLOOD CULTURE   EXTRA TUBE    Narrative:     The following orders were created for panel order [place] Draw.  Procedure                               Abnormality         Status                     ---------                               -----------         ------                     Extra Blue Top Tube[[id number]]                              Final result               Extra Red Top Tube[[id number]]                               Final result               Extra Green Top (Lithium...[[id number]]                      Final result                 Please view results for these tests on the individual orders.     XR Chest Port 1 View    (Results Pending)     .   Treatments provided: Bolus, aspirin, Rocephin  Family  Comments: Daughter here  OBS brochure/video discussed/provided to patient:  N/A  ED Medications:   Medications   lidocaine 1 % 0.1-1 mL (has no administration in time range)   lidocaine (LMX4) cream (has no administration in time range)   sodium chloride (PF) 0.9% PF flush 3 mL (has no administration in time range)   sodium chloride (PF) 0.9% PF flush 3 mL (has no administration in time range)   0.9% sodium chloride BOLUS (1,000 mLs Intravenous New Bag 9/6/21 1543)   cefTRIAXone (ROCEPHIN) 1 g vial to attach to  mL bag for ADULTS or NS 50 mL bag for PEDS (has no administration in time range)   aspirin (ASA) tablet 325 mg (325 mg Oral Given 9/6/21 1543)     Drips infusing:  No  For the majority of the shift, the patient's behavior Green. Interventions performed were N/A.    Sepsis treatment initiated: No     Patient tested for COVID 19 prior to admission: YES    ED Nurse Name/Phone Number: Ekaterina Raman RN,   3:59 PM  RECEIVING UNIT ED HANDOFF REVIEW    Above ED Nurse Handoff Report was reviewed: Yes  Reviewed by: Geni New RN on September 6, 2021 at 5:07 PM

## 2021-09-06 NOTE — PHARMACY-ADMISSION MEDICATION HISTORY
Admission medication history interview status for this patient is complete. See [facility] admission navigator for allergy information, prior to admission medications and immunization status.     Medication history interview done, indicate source(s): Patient  Medication history resources (including written lists, pill bottles, clinic record):[facility]  Pharmacy: [id number] - [place], [place] - [address] AT [facility] [address] [facility] [address]      Changes made to PTA medication list:  Added: paroxetine, Cosopt eye drops  Changed: none  Reported as Not Taking: none  Removed: hydrochlorothiazide 12.5 mg daily    Actions taken by pharmacist (provider contacted, etc):none     Additional medication history information:None    Medication reconciliation/reorder completed by provider prior to medication history?  N    Prior to Admission medications    Medication Sig Last Dose Taking? Auth Provider   dorzolamide-timolol (COSOPT) 2-0.5 % ophthalmic solution PLACE 1 DROP INTO BOTH EYES TWICE DAILY 9/6/2021 at AM x1 Yes Unknown, Entered By History   PARoxetine (PAXIL) 20 MG tablet Take 20 mg by mouth daily 9/6/2021 at AM Yes Unknown, Entered By History

## 2021-09-06 NOTE — ED PROVIDER NOTES
"  History   Chief Complaint:  Generalized Weakness      The history is provided by the patient and a relative (daughter).      Hedy English is a 85 year old female, with a history of hypertension, who presents to the ED for evaluation of generalized weakness. The patient's daughter reports increase in generalized weakness and fatigue over the last couple weeks. Today, patient told daughter that she was feeling \"woozy\". Daughter reports Hedy does not walk or stand. When she urinates, she uses a diaper. There has been an odor to her urine recently. She is not eating much because she is not hungry. She is drinking fine. She is not normally on oxygen. Denies breathing trouble, pain, cough, fever, chills.     Review of Systems   Constitutional: Positive for appetite change and fatigue. Negative for chills and fever.   Respiratory: Negative for shortness of breath and wheezing.    Cardiovascular: Negative for chest pain.   Gastrointestinal: Positive for nausea. Negative for abdominal pain.   Genitourinary:        (+) urinary odor   Musculoskeletal: Positive for gait problem. Negative for back pain.   Neurological: Positive for weakness.   All other systems reviewed and are negative.    Allergies:  No Known Allergies    Medications:    Paxil    Past Medical History:    Glaucoma  Depression  Diverticulitis  Osteopenia  Anxiety  Hypertension   Cholelithiasis    Past Surgical History:    Cataract removal  Glaucoma surgery x2     Family History:    Hypertension  Atherosclerosis  Cervical cancer  Scleroderma  Lupus    Social History:  Marital Status:   PCP: Carlito Roldan  Presents to the ED with EMS and daughter    Physical Exam     Patient Vitals for the past 24 hrs:   BP Temp Temp src Pulse Resp SpO2   09/06/21 1515 111/76 -- -- 88 -- 92 %   09/06/21 1500 91/65 -- -- 102 -- --   09/06/21 1445 112/80 -- -- 100 -- 96 %   09/06/21 1430 107/82 -- -- 96 -- 95 %   09/06/21 1415 120/87 -- -- 91 -- 94 % "   09/06/21 1400 111/78 -- -- 92 -- 95 %   09/06/21 1345 124/88 -- -- 92 -- 97 %   09/06/21 1330 135/85 -- -- (!) 48 -- 97 %   09/06/21 1320 -- -- -- -- -- 94 %   09/06/21 1315 122/83 -- -- 87 -- --   09/06/21 1301 102/86 97.2  F (36.2  C) Temporal 63 16 90 %       Physical Exam  General: The patient is alert, in no respiratory distress.    HENT: Mucous membranes moist.    Cardiovascular: Regular rate and rhythm. Good pulses in all four extremities. Normal capillary refill and skin turgor.     Respiratory: Lungs are clear. No nasal flaring. No retractions. No wheezing, no crackles.    Gastrointestinal: Abdomen soft. No guarding, no rebound. No palpable hernias. Abdomen is non tender.    Musculoskeletal: No gross deformity.     Skin: No rashes or petechiae. Pallor.    Neurologic: The patient is alert. GCS 15. No testable cranial nerve deficit. Follows commands with clear and appropriate speech. Gives appropriate answers. No gross neurologic deficit. Gross sensation intact. Pupils are round and reactive. No meningismus. Strength not intact. Adequate  strength. Weakness to sitting up right.     Lymphatic: No cervical adenopathy. No lower extremity swelling.    Psychiatric: The patient is non-tearful.     Emergency Department Course   ECG  ECG taken at 1408, ECG read at 1409  Sinus rhythm with marked sinus arrhythmia with occasional premature ventricular complexes. Left axis deviation. ST & T wave abnormality, consider Inferior infarct. ST & T wave abnormality, consider anterolateral ischemia. Prolonged QT. Abnormal ECG.   Rate 99 bpm. VA interval 168 ms. QRS duration 90 ms. QT/QTc 384/492 ms. P-R-T axes 48 -66 258.     Imaging:    XR Chest, portable, 1 view:  1. Prominent right hilar region likely represents a prominent right main pulmonary artery with superimposed ascending thoracic aorta. A prominent ascending thoracic aorta can be secondary to post stenotic dilatation from aortic valve stenosis. Recommend  clinical correlation.  2. Very subtle increased interstitial markings in the right upper lung could represent mild vascular congestion, scarring or subtle infiltrate.  3. No other evidence of acute cardiopulmonary disease is seen.    Reading per radiology    Laboratory:    CBC: WBC: 9.5, HGB: 13.7, PLT: 306    BMP: Glucose 159 (H), GFR: 58 (L), o/w WNL (Creatinine: 0.92)    UA: Color: Dark Yellow, Appearance: Cloudy, Bilirubin: Small, Ketones: Trace, Blood: Small, pH: 7.5 (H), Protein Albumin: 200, Urobilinogen: 4.0, Leukocyte Esterase: Large, WBC: >182 (H), RBC: 42 (H), Bacteria: Many, o/w Negative    Troponin (Collected 1357): <0.015    Lactic acid (Resulted 1408): 2.6 (H)    VBG: pH 7.41 / PCO2 39 (L) / PO2 15 (L) / Bicarb 25    Magnesium: 1.7    Blood Cultures x2: Pending    Asymptomatic COVID-19 PCR: Pending      Emergency Department Course:    Reviewed:  I reviewed the patient's nursing notes, vitals, past medical records, Care Everywhere.     Assessments:  1335 I obtained history and examined them as noted above.   1537 I rechecked her and explained findings. Discussed plans for admission.     Consults:   1546 I spoke with Dr. Salter of the hospitalist service regarding patient's presentation, findings, and plan of care.    1550 I spoke with radiology regarding imaging results.    Interventions:  1543: NS 1L IV Bolus   1543: Aspirin 325 mg PO  Rocephin 1 g in 100 mL IV infusion    Disposition:  The patient was admitted to the hospital under the care of Dr. Salter.       Impression & Plan        Medical Decision Making:   Hedy English is a 85 year old female who presents with generalized weakness without many associated features.  The patient has few medical problems Ms. complain of a steady decline she has had some lightheadedness.  I was most suspicious about a UTI and her urine has had a funny smell to it and in fact she does have a UTI.  Her lactic acid level is mildly elevated but she has not been  hypoxic.  Interesting her EKG shows ST strain I was concerned about ACS however in question the patient there is no chest pain shortness of breath.  I resent her chest x-ray there was some concern about the potential for pneumothorax however repeat imaging this is a skinfold.  The patient was started on antibiotics IV fluid and was admitted to hospital in good condition.  There is no current signs of a STEMI.      Diagnosis:     ICD-10-CM    1. Generalized muscle weakness  M62.81    2. UTI (urinary tract infection) with pyuria  N39.0    3. Elevated lactic acid level  R79.89    4. Abnormal electrocardiogram  R94.31        Scribe Disclosure:  I, Lynn Da Silva, am serving as a scribe on 9/6/2021 at 1:35 PM to personally document services performed by Soto Moreno MD based on my observations and the provider's statements to me.           Soto Moreno MD  09/06/21 6920

## 2021-09-07 ENCOUNTER — APPOINTMENT (OUTPATIENT)
Dept: OCCUPATIONAL THERAPY | Facility: CLINIC | Age: 86
DRG: 689 | End: 2021-09-07
Attending: INTERNAL MEDICINE
Payer: COMMERCIAL

## 2021-09-07 ENCOUNTER — APPOINTMENT (OUTPATIENT)
Dept: PHYSICAL THERAPY | Facility: CLINIC | Age: 86
DRG: 689 | End: 2021-09-07
Attending: INTERNAL MEDICINE
Payer: COMMERCIAL

## 2021-09-07 LAB
ATRIAL RATE - MUSE: 99 BPM
DIASTOLIC BLOOD PRESSURE - MUSE: NORMAL MMHG
INTERPRETATION ECG - MUSE: NORMAL
P AXIS - MUSE: 48 DEGREES
PR INTERVAL - MUSE: 168 MS
QRS DURATION - MUSE: 90 MS
QT - MUSE: 384 MS
QTC - MUSE: 492 MS
R AXIS - MUSE: -66 DEGREES
SYSTOLIC BLOOD PRESSURE - MUSE: NORMAL MMHG
T AXIS - MUSE: 258 DEGREES
VENTRICULAR RATE- MUSE: 99 BPM

## 2021-09-07 PROCEDURE — 250N000013 HC RX MED GY IP 250 OP 250 PS 637: Performed by: INTERNAL MEDICINE

## 2021-09-07 PROCEDURE — 99232 SBSQ HOSP IP/OBS MODERATE 35: CPT | Performed by: INTERNAL MEDICINE

## 2021-09-07 PROCEDURE — 258N000003 HC RX IP 258 OP 636: Performed by: INTERNAL MEDICINE

## 2021-09-07 PROCEDURE — 97530 THERAPEUTIC ACTIVITIES: CPT | Mod: GP

## 2021-09-07 PROCEDURE — 97530 THERAPEUTIC ACTIVITIES: CPT | Mod: GO | Performed by: REHABILITATION PRACTITIONER

## 2021-09-07 PROCEDURE — 250N000011 HC RX IP 250 OP 636: Performed by: INTERNAL MEDICINE

## 2021-09-07 PROCEDURE — 97161 PT EVAL LOW COMPLEX 20 MIN: CPT | Mod: GP

## 2021-09-07 PROCEDURE — 97166 OT EVAL MOD COMPLEX 45 MIN: CPT | Mod: GO | Performed by: REHABILITATION PRACTITIONER

## 2021-09-07 PROCEDURE — 120N000001 HC R&B MED SURG/OB

## 2021-09-07 PROCEDURE — 97535 SELF CARE MNGMENT TRAINING: CPT | Mod: GO | Performed by: REHABILITATION PRACTITIONER

## 2021-09-07 RX ORDER — SODIUM CHLORIDE 9 MG/ML
INJECTION, SOLUTION INTRAVENOUS CONTINUOUS
Status: ACTIVE | OUTPATIENT
Start: 2021-09-07 | End: 2021-09-08

## 2021-09-07 RX ORDER — MULTIPLE VITAMINS W/ MINERALS TAB 9MG-400MCG
1 TAB ORAL DAILY
Status: DISCONTINUED | OUTPATIENT
Start: 2021-09-07 | End: 2021-09-08 | Stop reason: HOSPADM

## 2021-09-07 RX ADMIN — ONDANSETRON 4 MG: 4 TABLET, ORALLY DISINTEGRATING ORAL at 08:10

## 2021-09-07 RX ADMIN — DORZOLAMIDE HYDROCHLORIDE AND TIMOLOL MALEATE 1 DROP: 22.3; 6.8 SOLUTION/ DROPS OPHTHALMIC at 08:43

## 2021-09-07 RX ADMIN — SODIUM CHLORIDE: 9 INJECTION, SOLUTION INTRAVENOUS at 15:12

## 2021-09-07 RX ADMIN — PAROXETINE HYDROCHLORIDE 20 MG: 20 TABLET, FILM COATED ORAL at 08:10

## 2021-09-07 RX ADMIN — CEFTRIAXONE 2 G: 2 INJECTION, POWDER, FOR SOLUTION INTRAMUSCULAR; INTRAVENOUS at 11:04

## 2021-09-07 RX ADMIN — DORZOLAMIDE HYDROCHLORIDE AND TIMOLOL MALEATE 1 DROP: 22.3; 6.8 SOLUTION/ DROPS OPHTHALMIC at 20:46

## 2021-09-07 RX ADMIN — MULTIPLE VITAMINS W/ MINERALS TAB 1 TABLET: TAB at 11:04

## 2021-09-07 RX ADMIN — SODIUM CHLORIDE: 9 INJECTION, SOLUTION INTRAVENOUS at 04:08

## 2021-09-07 ASSESSMENT — ACTIVITIES OF DAILY LIVING (ADL)
ADLS_ACUITY_SCORE: 28
ADLS_ACUITY_SCORE: 26
ADLS_ACUITY_SCORE: 26
ADLS_ACUITY_SCORE: 28
ADLS_ACUITY_SCORE: 28
ADLS_ACUITY_SCORE: 26

## 2021-09-07 NOTE — PROGRESS NOTES
"  [facility]  Hospitalist Progress Note  Santiago Salter MD 09/07/2021    Reason for Stay (Diagnosis): UTI         Assessment and Plan:      Summary of Stay: Hedy English is a 85-year-old female with a history of short-term memory loss although no formal diagnosis of dementia previously, nonambulatory since 06/2021 for unclear reasons, who presents to the hospital today for concerns about weakness, decreased oral intake, and malodorous urine.  The patient was found to have a urinary tract infection.    1.  Urinary tract infection.    - Continue Rocephin.    - Urine cx pending but is compromised as this was collected after her initial antibiotic dose was given.    2.  Generalized weakness and deconditioning.  The patient apparently nonambulatory since 06/2021 for somewhat unclear reasons.  Daughter attributes this to patient being homebound due to current pandemic.    - PT/OT consult ordered  - TCU recommended on discharge  4.  Short-term memory loss, though no formal diagnosis of dementia previously.  Daughter reports this has been progressive.  5.  Abnormal EKG with no symptoms of cardiac ischemia and negative troponin.  6.  Anxiety history.  7.  Severe malnutrition with declining weight.  Appreciate nutrition input.  TSH normal  8.  Dehydration  - continue gentle IVF today    I called and updated dtr Kathy today    DVT Prophylaxis: Pneumatic Compression Devices  Code Status: DNR / DNI  Discharge Dispo: TCU recommended  Estimated Disch Date / # of Days until Disch: likely stable for discharge by tomorrow        Interval History (Subjective):      No complaints today.  No pain                  Physical Exam:      Last Vital Signs:  /71 (BP Location: Left arm)   Pulse 61   Temp 97.7  F (36.5  C) (Axillary)   Resp 16   Ht 1.702 m (5' 7\")   Wt 49.4 kg (109 lb)   SpO2 97%   BMI 17.07 kg/m        Intake/Output Summary (Last 24 hours) at 9/7/2021 1526  Last data filed at 9/7/2021 " 1406  Gross per 24 hour   Intake 430 ml   Output 300 ml   Net 130 ml       Constitutional: Awake, alert, cooperative, no apparent distress   Respiratory: Clear to auscultation bilaterally, no crackles or wheezing   Cardiovascular: Regular rate and rhythm, normal S1 and S2, and no murmur noted   Abdomen: Normal bowel sounds, soft, non-distended, non-tender   Skin: No rashes, no cyanosis, dry to touch   Neuro: Alert and awake, no weakness, numbness, memory loss   Extremities: No edema, normal range of motion   Other(s):        All other systems: Negative          Medications:      All current medications were reviewed with changes reflected in problem list.         Data:      All new lab and imaging data was reviewed.   Labs:  Recent Labs   Lab 09/06/21  1357   WBC 9.5   HGB 13.7   HCT 41.3   MCV 98         Imaging:   Recent Results (from the past 24 hour(s))   XR Chest Port 1 View    Narrative    CHEST PORTABLE ONE VIEW   9/6/2021 3:24 PM     HISTORY: Shortness of breath.    COMPARISON: Chest CT and chest x-rays dated 9/20/2020.    FINDINGS: Skin fold over the left chest on the initial image resolves  on subsequent imaging indicating no pneumothorax. Subtle strandy  densities project over the right upper lung could represent scarring,  vascular congestion or a very subtle infiltrate. Lungs are otherwise  grossly clear. Right hilar region is prominent which could represent a  prominent ascending thoracic aorta as can be seen with aortic valve  stenosis. There is likely at least a contribution by prominent main  right pulmonary artery. Thoracic aorta is somewhat tortuous and  partially calcified. No pneumothorax on the right identified. No large  pleural fluid collection. No acute fracture.      Impression    IMPRESSION:  1. Prominent right hilar region likely represents a prominent right  main pulmonary artery with superimposed ascending thoracic aorta. A  prominent ascending thoracic aorta can be secondary to  post stenotic  dilatation from aortic valve stenosis. Recommend clinical correlation.  2. Very subtle increased interstitial markings in the right upper lung  could represent mild vascular congestion, scarring or subtle  infiltrate.  3. No other evidence of acute cardiopulmonary disease is seen.    SEBASTIAN KIM MD         SYSTEM ID:  [id number]

## 2021-09-07 NOTE — PLAN OF CARE
End of Shift Summary  For vital signs and complete assessments, please see documentation flowsheets.     Pertinent assessments: Alert to self/place. VSS. HR: irregular. Denies pain and nausea. Total care, turn Q2. Refuses to eat/drink d/t poor appetite. Incontinent of urine- pure wick in place.     Major Shift Events: Admitted to floor     Treatment Plan: Rocephin, IVF. PT/OT/Care Coordinator consulted.

## 2021-09-07 NOTE — CONSULTS
Care Management Initial Consult    General Information  Assessment completed with: Patient, Family, Kathy -daughter   Type of CM/SW Visit: Initial Assessment    Primary Care Provider verified and updated as needed:     Readmission within the last 30 days:        Reason for Consult: discharge planning  Advance Care Planning:        PT does not have HCD but daughter does assist with decisions     Communication Assessment  Patient's communication style: spoken language (English or Bilingual)             Cognitive  Cognitive/Neuro/Behavioral: .WDL except, orientation  Level of Consciousness: confused, alert  Arousal Level: opens eyes spontaneously  Orientation: disoriented to, place, time, situation  Mood/Behavior: calm, cooperative  Best Language: 0 - No aphasia  Speech: clear    Living Environment:   People in home: child(reyes), adult  dionna  Current living Arrangements: mobile home      Able to return to prior arrangements: yes  Living Arrangement Comments: has 24 hour support at home and daughter stops in daily     Family/Social Support:  Care provided by: child(reyes)  Provides care for: no one  Marital Status:   Children          Description of Support System: Supportive, Involved    Support Assessment: Adequate family and caregiver support, Adequate social supports    Current Resources:   Patient receiving home care services: No     Community Resources: None  Equipment currently used at home: none  Supplies currently used at home: None    Employment/Financial:  Employment Status: retired        Financial Concerns: No concerns identified   Referral to Financial Counselor: No       Lifestyle & Psychosocial Needs:  Social Determinants of Health     Tobacco Use:      Smoking Tobacco Use:      Smokeless Tobacco Use:    Alcohol Use:      Frequency of Alcohol Consumption:      Average Number of Drinks:      Frequency of Binge Drinking:    Financial Resource Strain:      Difficulty of Paying Living Expenses:    Food  Insecurity:      Worried About Running Out of Food in the Last Year:      Ran Out of Food in the Last Year:    Transportation Needs:      Lack of Transportation (Medical):      Lack of Transportation (Non-Medical):    Physical Activity:      Days of Exercise per Week:      Minutes of Exercise per Session:    Stress:      Feeling of Stress :    Social Connections:      Frequency of Communication with Friends and Family:      Frequency of Social Gatherings with Friends and Family:      Attends [employer] Services:      Active Member of Clubs or Organizations:      Attends Club or Organization Meetings:      Marital Status:    Intimate Partner Violence:      Fear of Current or Ex-Partner:      Emotionally Abused:      Physically Abused:      Sexually Abused:    Depression:      PHQ-2 Score:    Housing Stability:      Unable to Pay for Housing in the Last Year:      Number of Places Lived in the Last Year:      Unstable Housing in the Last Year:        Functional Status:  Prior to admission patient needed assistance:              Mental Health Status:  Mental Health Status: No Current Concerns       Chemical Dependency Status:  Chemical Dependency Status: No Current Concerns             Values/Beliefs:  Spiritual, Cultural Beliefs, Uatsdin Practices, Values that affect care: no               Additional Information:  Met with pt and spoke with daughter Kathy on the phone.. No one  Seems to understand why pt ended up being bed bound after being independent in June.. Pt today is being treated for UTI.. Pt has not been to TCU and has not sued Home Care in the past    Pt and family both agreeable to recommendations for TCU.. SW spoke about options for placement and that due to current situation may take time to find a facility that can accept.. pt has been vaccinated.. Location is important for daughter as she would like to be involved and supportive    Pt due to needing lift will need W/C transport once TCU has been  located.. SPoke with both pt and daughter about cost of transport. Kathy stated pt has money to cover so not concerned.     SW will send referrals once MD has seen pt and can provide Estimated length of stay         Corinne C. White, LSW

## 2021-09-07 NOTE — CONSULTS
"CLINICAL NUTRITION SERVICES  -  ASSESSMENT NOTE      RECOMMENDATIONS FOR MD/PROVIDER TO ORDER:   Pt declining use of all oral nutrition supplements despite strong encouragement. Strongly recommend GOC discussions with pt declining PO intake. RD will continue to follow and encourage PO intakes in the meantime.     Nutrition goals: Pt will consume 1200 kcal/day and 55 g protein/day via calorie counts vs initiation of nutrition support, pending GOC    Recommendations Ordered by Registered Dietitian (RD):   Snacks/Supplements PRN  Calorie Counts 9/7-9/9  MVI+M for micronutrient needs with poor PO intake    Future/Additional Recommendations:   Continue regular diet and encourage adequate meals TID   Encourage supplement use as able    Malnutrition:   % Weight Loss:  > 20% in 1 year (severe malnutrition)  % Intake:  </= 50% for >/= 1 month (severe malnutrition)  Subcutaneous Fat Loss:  Orbital region moderate-severe depletion, Upper arm region severe depletion and Thoracic region severe depletion  Muscle Loss:  Temporal region severe depletion, Clavicle bone region severe depletion, Acromion bone region severe depletion, Scapular bone region severe depletion and Dorsal hand region severe depletion  Fluid Retention:  None noted    Malnutrition Diagnosis: Severe malnutrition  In Context of: Acute on chronic illness or disease AND Environmental or social circumstances       REASON FOR ASSESSMENT  Hedy English is a 85 year old female seen by Registered Dietitian for Provider Order - weight loss, malnutrition    Per chart review, PMH significant for anxiety and glaucoma.  Admitted 2/2 bladder infection.       NUTRITION HISTORY  - Information obtained from chart review and patient at bedside   - Pt is not previously known to clinical nutrition services.   - Per H&P: Pt's daughter reports pt has been \"going downhill\" since 3/2021. The pt has not gotten out of bed since 6/2021. With this, oral intake has decreased. Noted pt " "does not have full conversations at baseline and has been communicating less over the past several months.   - Pt follows a regular diet at home  - Typical food/fluid intake: Pt states \"not much\". States her son sometimes makes food for her otherwise she doesn't eat. When writer asked what kind of food she likes, pt responded with \"I don't really know anymore\".   - Food allergies: None noted   - Chewing/swallowing: No difficulties noted during conversation today. Appears to have good dentition on visual exam.       CURRENT NUTRITION ORDERS  Diet Order:     Regular     Current Intake/Tolerance:  Pt ordered breakfast this morning of scrambled eggs, pancakes and OJ. Pt states she only took a few bites of her breakfast tray this morning.       NUTRITION FOCUSED PHYSICAL ASSESSMENT FOR DIAGNOSING MALNUTRITION)  Yes           Observed:    Muscle wasting (refer to documentation in Malnutrition section) and Subcutaneous fat loss (refer to documentation in Malnutrition section)    Obtained from Chart/Interdisciplinary Team:  - Last BM yesterday   - Demarco nutrition risk: 2; Overall: 14    ANTHROPOMETRICS  Height: 5' 7\"  Weight: 109 lbs 0 oz  Body mass index is 17.07 kg/m .  Weight Status:  Underweight BMI <18.5  Weight History: Over the past year, pt has lost 69 lb (39% wt loss) which is clinically significant.   Wt Readings from Last 10 Encounters:   09/06/21 49.4 kg (109 lb)   Per Care Everywhere: 9/23/20 --> 80.7 kg (178 lb)    LABS  Labs reviewed  Recent Labs   Lab 09/06/21  1357   *     Trace urine ketones on admission     MEDICATIONS  Medications reviewed  - IVF at 100 ml/hr       ASSESSED NUTRITION NEEDS PER APPROVED PRACTICE GUIDELINES:    Dosing Weight 49.4 kg   Estimated Energy Needs: 3681-4431+ kcals (30-35+ Kcal/Kg)  Justification: repletion and underweight  Estimated Protein Needs: 60-75 grams protein (1.2-1.5 g pro/Kg)  Justification: Repletion and preservation of lean body mass  Estimated Fluid Needs: " (1 mL/Kcal)  Justification: maintenance or per provider pending fluid status      NUTRITION DIAGNOSIS:  Unintended weight loss related to suspected decreased appetite, inability to care for self at home, bed-bound x 3 months as evidenced by severe fat and muscle wasting, minimal PO intake for several months PTA, trace urine ketones on admission and 39% wt loss over the past year      NUTRITION INTERVENTIONS  Recommendations / Nutrition Prescription  Continue regular diet and encourage meals TID  Snacks/supplements PRN --> highly encourage intake, pt declining at this time despite strong encouragement from writer   MVI+M for micronutrient needs with poor PO intake   Recommend GOC discussions, pt would be strong candidate for FT if aligns with GOC   Calorie Counts 9/7-9/9      Implementation  Nutrition education: Introduced RD and role of care. Discussed current intake/tolerance with pt. Strongly encouraged meals TID. Writer discussed use of oral supplements for increased calorie and protein intake, but pt declined use at this time. When pt declined initially, writer strongly encouraged pt to reconsider and discussed various options, but pt still declined use.   Medical Food Supplement  Multivitamin/Mineral  Collaboration and Referral of Nutrition care - pt discussed this morning during interdisciplinary rounds       Nutrition Goals  Pt will consume 1200 kcal/day and 55 g protein/day via calorie counts vs initiation of nutrition support, pending GOC       MONITORING AND EVALUATION:  Progress towards goals will be monitored and evaluated per protocol and Practice Guidelines      Jenni Sapp RD, LD  Unit RD Pager: [phone]

## 2021-09-07 NOTE — PROGRESS NOTES
"   09/07/21 0900   Quick Adds   Type of Visit Initial PT Evaluation   Living Environment   People in home child(reyes), adult  (son, daughter comes over everyday to help out)   Current Living Arrangements mobile home   Living Environment Comments Per H&P: \"The patient's daughter reports that the patient has been \"going downhill\" since 03/2021.  At that time, her daughter noticed worsening short-term memory problems.  Since 06/2021 the patient has not been walking.  Her daughter is not sure why that is the case but blames it in part on COVID with having to remain indoors to avoid infection...The patient is currently residing with her son, Dc.  According to her daughter, the patient's son has some cognitive dysfunction.  The patient's daughter lives near the patient and her son in a trailer park and checks in on them frequently.  She helps care for her mother, assisting with her daily cares.  Her daughter says that her mother has not been out of bed since 06/2021 and is proud of the fact that the patient does not have any bedsores due to the care that she is providing.\"   Self-Care   Usual Activity Tolerance poor   Current Activity Tolerance poor   General Information   Onset of Illness/Injury or Date of Surgery 09/06/21   Referring Physician Gaetano KAY   Patient/Family Therapy Goals Statement (PT) To be able to walk again   Pertinent History of Current Problem (include personal factors and/or comorbidities that impact the POC) Per EMR: \"85-year-old female with a history of anxiety and glaucoma.  She presents to the hospital today with her daughter for concerns about weakness and foul-smelling urine.  She has been diagnosed with a urinary tract infection on ER workup.\"   Existing Precautions/Restrictions fall   Cognition   Orientation Status (Cognition) oriented to;person   Affect/Mental Status (Cognition) confused   Follows Commands (Cognition) follows one-step commands   Behavioral Issues withdrawn   Pain " Assessment   Patient Currently in Pain   (Denies, but then reports mid back pain with sitting)   Posture    Posture Forward head position;Protracted shoulders   Strength   Strength Comments Profound global weakness; difficulty even lifting each LE off the bed.    Bed Mobility   Comment (Bed Mobility) Semi-supine>sit with maxA   Transfers   Transfer Safety Comments Not able to safely progress to transfers or gait this session.    Balance   Balance Comments Poor static seated balance, graded A needed (CGA to modA).    Clinical Impression   Criteria for Skilled Therapeutic Intervention yes, treatment indicated   PT Diagnosis (PT) Generalized weakness   Influenced by the following impairments Impaired balance, decreased strength globally, impaired strength   Functional limitations due to impairments Decreased IND with bed mobility, transfers, ambulation    Clinical Presentation Evolving/Changing   Clinical Decision Making (Complexity) moderate complexity   Therapy Frequency (PT) 5x/week   Predicted Duration of Therapy Intervention (days/wks) One week   Planned Therapy Interventions (PT) bed mobility training;gait training;balance training;neuromuscular re-education;patient/family education;strengthening;transfer training;progressive activity/exercise   Risk & Benefits of therapy have been explained evaluation/treatment results reviewed;care plan/treatment goals reviewed;current/potential barriers reviewed;participants voiced agreement with care plan;participants included;patient   PT Discharge Planning    PT Discharge Recommendation (DC Rec) Transitional Care Facility   PT Rationale for DC Rec Patient significantly below her baseline. Has not been OOB since 6/2021; however was ambulatory prior. Would recommend trial of TCU to maximize IND/strength and to decrease caregiver burden on discharge. If pt/family refuse would recommend lift assistance, hospital bed and wheelchair in the home and HHPT. HHPT as leaving the house  would be a taxing effort.    PT Brief overview of current status  Lift assist   Total Evaluation Time   Total Evaluation Time (Minutes) 5

## 2021-09-07 NOTE — PROGRESS NOTES
DX: UTI, weakness  Tele: na  A&O oriented to self only  Activity: lift A-2  Diet: Reg  VSS: Q8  O2: RA mid 90's  BG: na  PIV: NS infusing 75mL/hr in LA  Pain: denies  GI/: incontinent purewick in place  Labs: blood culture showed no growth  Plan: PT, OT, SW, Nutrition  Discharge: TBD TCU?  Continue plan of care.   Rocephin for UTI Pt took a couple bites of her breakfast and then felt nauseous had Zofran.  Has little appetite.

## 2021-09-07 NOTE — UTILIZATION REVIEW
Admission Status; Secondary Review Determination    Under the authority of the Utilization Management Committee, the utilization review process indicated a secondary review on the above patient. The review outcome is based on review of the medical records, discussions with staff, and applying clinical experience noted on the date of the review.    (x) Inpatient Status Appropriate - This patient's medical care is consistent with medical management for inpatient care and reasonable inpatient medical practice.    RATIONALE FOR DETERMINATION: 85-year-old female who presents with acute progressive weakness with some early encephalopathic changes and marked pyuria along with nausea concerning for acute UTI with systemic symptoms.  Patient expected require greater than 2 nights in the hospital with ongoing IV fluids and IV antibiotics appropriate for inpatient care.    At the time of admission with the information available to the attending physician more than 2 nights Hospital complex care was anticipated, based on patient risk of adverse outcome if treated as outpatient and complex care required. Inpatient admission is appropriate based on the Medicare guidelines.    This document was produced using voice recognition software    The information on this document is developed by the utilization review team in order for the business office to ensure compliance. This only denotes the appropriateness of proper admission status and does not reflect the quality of care rendered.    The definitions of Inpatient Status and Observation Status used in making the determination above are those provided in the CMS Coverage Manual, Chapter 1 and Chapter 6, section 70.4.    Sincerely,    Zheng Sandoval MD  Utilization Review  Physician Advisor  [facility].

## 2021-09-07 NOTE — PROGRESS NOTES
Alert. Oriented to self. VSS, on RA. Total care - turn&repo q2h. Incontinent, pure wick in place. IVF infusing. Poor oral intake- encouraged. Denies pain and nausea. Protective dressing over sacrum, and protective bandage over dry, intact scabs to right forearm. Poor skin turgor.   Treatment: Follow cultures, IVF, Rocephin. PT/OT/Nutrition/SW consults.

## 2021-09-07 NOTE — H&P
"Admitted: 09/06/2021    CHIEF COMPLAINT:  Weakness.  The patient found to have bladder infection.    HISTORY OF PRESENT ILLNESS:  Ms. English is an 85-year-old female with a history of anxiety and glaucoma.  She presents to the hospital today with her daughter for concerns about weakness and foul-smelling urine.  She has been diagnosed with a urinary tract infection on ER workup.    The patient's daughter reports that the patient has been \"going downhill\" since 03/2021.  At that time, her daughter noticed worsening short-term memory problems.  Since 06/2021 the patient has not been walking.  Her daughter is not sure why that is the case but blames it in part on COVID with having to remain indoors to avoid infection.  The patient never had COVID and is vaccinated.  The patient's daughter has noticed decreasing oral intake as well and is concerned about her mother's nutrition.  Her daughter relates that patient is talking and communicating less today, which she says is likely due to a bladder infection.  Normally, the patient does have full conversations.    The patient is currently residing with her son, Dc.  According to her daughter, the patient's son has some cognitive dysfunction.  The patient's daughter lives near the patient and her son in a trailer park and checks in on them frequently.  She helps care for her mother, assisting with her daily cares.  Her daughter says that her mother has not been out of bed since 06/2021 and is proud of the fact that the patient does not have any bedsores due to the care that she is providing.    On arrival to the ER today, vital signs included a blood pressure of 125/90 with a heart rate of 90.  No fever.  Saturation 97% on 2 liters of oxygen.  Workup included labs and imaging.  CBC is normal.  Blood cultures obtained and pending.  Lactic acid 2.6.  VBG unremarkable.  Magnesium normal.  Basic metabolic panel normal.  Troponin undetectable.  COVID-19 is negative.  Urinalysis " shows pyuria consistent with a UTI.  Urine culture has been ordered but not yet collected.  The patient has received IV Rocephin for UTI.  Chest x-ray was performed showing no acute findings.    I spoke with Dr. Mondragon of the ER.  I asked him to add on urine culture.  He was unable to at this time, but ordered this as a stat collection.  The patient has already received IV Rocephin.  Plan is for admission to the hospital.    PAST MEDICAL HISTORY:    1.  Glaucoma.  2.  Anxiety.    ALLERGIES:  NONE.    CURRENT MEDICATIONS:  Paxil.    FAMILY HISTORY:  Reviewed.  Nothing contributory to this admission.    SOCIAL HISTORY:  The patient denies smoking or drinking.  Reviewed code status with the patient and her daughter.  She is a DNR/DNI.  As above, the patient currently lives with her son, Dc, who apparently is cognitively impaired.  The patient's daughter, Kathy, whom I am speaking with today, lives near them in a trailer park and helps to care for the patient.  The patient is nonambulatory, and Kathy provides cares for her.    REVIEW OF SYSTEMS:  See HPI for details.  Comprehensive greater than 10-point review of systems is otherwise negative besides that detailed above.  No history of cardiac disease.  No hypertension.  No diabetes mellitus.  No chest pain or shortness of breath.    PHYSICAL EXAMINATION:    VITAL SIGNS:  Blood pressure is currently 125/80.  Heart rate 87.  No fever.  Saturation 95% on 2 liters of oxygen.  GENERAL:  The patient appears nontoxic and in no acute distress.  She appears awake and alert.  She answers some yes and no questions, but does not give detailed answers.  Her daughter present at bedside is providing much of the historical information.  HEENT:  Head is atraumatic.  Sclerae white.  Eyelids normal.  Conjunctivae normal.  Extraocular movements are intact.  NECK:  Supple.  No cervical or supraclavicular lymphadenopathy.  CARDIOVASCULAR:  Heart is normal rhythm.  No significant  murmurs.  No lower extremity edema.  LUNGS:  Clear to auscultation bilaterally.  No intercostal retractions.  No conversational dyspnea.  ABDOMEN:  Nontender and soft, no masses, no organomegaly.  EXTREMITIES:  Show no edema.  SKIN:  Reveals no rashes.  No jaundice.  Skin is dry to touch.  NEUROLOGIC:  Cranial nerves II through XII appear to be intact.  Moves all extremities appropriately.  Strength testing of lower extremities including dorsiflexion and plantarflexion appear to be normal bilaterally.  Sensation to touch is normal in the bilateral lower extremities.  When asked to raise her legs off of the gurney, she was able to partially raise her lower extremities but not completely.    PSYCHIATRIC:  Awake and alert.  Answers simple questions with 1-word answers.  I suspect some confusion may be present.    LABORATORY AND IMAGING DATA:  Reviewed above in HPI.  I personally reviewed the EKG, showing a sinus arrhythmia with occasional PVC.  Heart rate 99.  T-wave inversions in V2 through V6 with ST depression in V2.    IMPRESSION AND PLAN:  Ms. English is an 85-year-old female with a history of short-term memory loss although no formal diagnosis of dementia previously, nonambulatory since 06/2021 for unclear reasons, who presents to the hospital today for concerns about weakness, decreased oral intake, and malodorous urine.  The patient was found to have a urinary tract infection.  1.  Urinary tract infection.  2.  Generalized weakness and deconditioning.  The patient apparently nonambulatory since 06/2021 for somewhat unclear reasons.  Daughter attributes this to patient being homebound due to current pandemic.  Daughter provides the cares for patient.  3.  Lactic acidosis due to dehydration and urinary tract infection.  4.  Short-term memory loss, though no formal diagnosis of dementia previously.  Daughter reports this has been progressive.  5.  Abnormal EKG with no symptoms of cardiac ischemia and a negative  troponin.  6.  Anxiety history.  7.  Malnutrition with declining weight.    PLAN:    1.  Admit to inpatient service.  Anticipate greater than 2 midnights in the hospital.  2.  IV fluids overnight.  3.  IV Rocephin for urinary tract infection.  4.  Urine culture has been ordered by ER.  This has not yet been collected prior to antibiotic administration.  This may affect urine culture, and urine culture may be negative due to the patient already receiving antibiotics.  5.  Physical Therapy and Occupational Therapy consultations.  6.  Social Work consult to assist with disposition.  7.  Nutrition consult to assist with weight loss and malnutrition.  8.  DNR/DNI code status, confirmed with the patient and daughter.    Santiago Salter MD        D: 2021   T: 2021   MT: CHARLENE    Name:     INESSA BAILEY  MRN:      [id number]        Account:     [id number]   :      1935           Admitted:    2021       Document: [id number]    cc:  [facility]

## 2021-09-07 NOTE — PROGRESS NOTES
09/07/21 0700   Quick Adds   Type of Visit Initial Occupational Therapy Evaluation   Living Environment   People in home child(reyes), adult  (son, daughter comes over everyday to help out)   Current Living Arrangements mobile home   Living Environment Comments Lives with son. Reports daughter visits everyday to assist with self-cares and home cares.   Self-Care   Usual Activity Tolerance poor   Current Activity Tolerance poor   Regular Exercise No   Equipment Currently Used at Home none   Activity/Exercise/Self-Care Comment daughter reported to assist with hygiene, self-cares, cleaning. Per chart pt uses depends for tolieting.   Instrumental Activities of Daily Living (IADL)   IADL Comments Son and daughter reported to help with home cares   Disability/Function   Concentrating, Remembering or Making Decisions Difficulty yes   Fall history within last six months no   General Information   Onset of Illness/Injury or Date of Surgery 09/06/21   Referring Physician Santiago Salter MD   Patient/Family Therapy Goal Statement (OT) no stated    Additional Occupational Profile Info/Pertinent History of Current Problem Pt admitted for UTI and generalized weakness. Since 6/21 pt has been staying in bed, depedent for all ADLs and IADLs. Pt has a son and daughter, who are reported to be helping assist in cares. Presented with increased confusion to date and year.     Performance Patterns (Routines, Roles, Habits) mother   Existing Precautions/Restrictions fall   Limitations/Impairments safety/cognitive   General Observations and Info Upon arrival pt in bed and agreed to therapy. Pt has difficulty answering orientation questions throughout session.    Cognitive Status Examination   Orientation Status person;place  (unable to state month and year)   Affect/Mental Status (Cognitive) confused   Safety Deficit ability to follow commands   Executive Function Deficit initiation   Cognitive Status Comments Pt requires v/c to  initate feeding and sitting EOB. Decreased memory and noted confusion. Will monitor as infection clears and assess as needed.    Pain Assessment   Patient Currently in Pain Yes, see Vital Sign flowsheet  (reports back pain when sitting EOB)   Posture   Posture forward head position;protracted shoulders   Posture Comments Able to pick head up while seated EOB, sits in a stooped posture   Range of Motion Comprehensive   General Range of Motion bilateral upper extremity ROM WFL   Comment, General Range of Motion slow movement, slightly shakey    Strength Comprehensive (MMT)   General Manual Muscle Testing (MMT) Assessment neck/trunk strength deficits identified;upper extremity strength deficits identified;hand strength deficits identified   Comment, General Manual Muscle Testing (MMT) Assessment low hand strength, unable to hold head and trunk up for an extended period of time. Able to hold head up with cues for <10 seconds.    Hand Strength   Hand Strength Comments pt demonstrates weak B hand strength.    Muscle Tone Assessment   Muscle Tone Comments decreased muscle mass    Coordination   Coordination Comments shakey, unable to grasp fork. Difficulty coordinating food onto fork.    Bed Mobility   Bed Mobility rolling left;rolling right;supine-sit   Rolling Left [place] (Bed Mobility) supervision;verbal cues   Rolling Right [place] (Bed Mobility) supervision;verbal cues   Supine-Sit [place] (Bed Mobility) moderate assist (50% patient effort);verbal cues   Assistive Device (Bed Mobility) bed rails   Balance   Balance Comments Flucuates CGA to min A for sitting EOB    Activities of Daily Living   BADL Assessment feeding   Eating/Self Feeding   [place] Level (Feeding) verbal cues;set up;minimum assist (75% patient effort)   Position (Self-Feeding) sitting up in bed   Clinical Impression   Criteria for Skilled Therapeutic Interventions Met (OT) yes;meets criteria;skilled treatment is necessary   OT  Diagnosis ADLs/IADLs decreased    OT Problem List-Impairments impacting ADL problems related to;activity tolerance impaired;cognition;range of motion (ROM);postural control;strength;inability to direct their own care   Assessment of Occupational Performance 3-5 Performance Deficits   Identified Performance Deficits bathing, feeding, tolieting, community mobility   Planned Therapy Interventions (OT) ADL retraining;cognition;strengthening;transfer training;progressive activity/exercise   Clinical Decision Making Complexity (OT) moderate complexity   Therapy Frequency (OT) 5x/week   Predicted Duration of Therapy one week   Risk & Benefits of therapy have been explained evaluation/treatment results reviewed;patient;participants voiced agreement with care plan   OT Discharge Planning    OT Discharge Recommendation (DC Rec) Transitional Care Facility   OT Rationale for DC Rec pt has had several months of decline in status at home. Would recomend TCU to maximize ADL and mobility independnece and further aid in dc recommendations moving forward. Will continue in IP OT.    Total Evaluation Time (Minutes)   Total Evaluation Time (Minutes) 10   Skin WDL   Skin Integrity/Characteristics scab  (two CDI scabs)

## 2021-09-08 ENCOUNTER — LAB REQUISITION (OUTPATIENT)
Dept: LAB | Facility: CLINIC | Age: 86
End: 2021-09-08

## 2021-09-08 VITALS
HEIGHT: 67 IN | BODY MASS INDEX: 17.11 KG/M2 | OXYGEN SATURATION: 97 % | HEART RATE: 67 BPM | WEIGHT: 109 LBS | TEMPERATURE: 97.6 F | SYSTOLIC BLOOD PRESSURE: 132 MMHG | DIASTOLIC BLOOD PRESSURE: 80 MMHG | RESPIRATION RATE: 16 BRPM

## 2021-09-08 DIAGNOSIS — Z11.1 ENCOUNTER FOR SCREENING FOR RESPIRATORY TUBERCULOSIS: ICD-10-CM

## 2021-09-08 PROCEDURE — 250N000013 HC RX MED GY IP 250 OP 250 PS 637: Performed by: INTERNAL MEDICINE

## 2021-09-08 PROCEDURE — 250N000011 HC RX IP 250 OP 636: Performed by: INTERNAL MEDICINE

## 2021-09-08 PROCEDURE — 258N000003 HC RX IP 258 OP 636: Performed by: INTERNAL MEDICINE

## 2021-09-08 PROCEDURE — 99239 HOSP IP/OBS DSCHRG MGMT >30: CPT | Performed by: INTERNAL MEDICINE

## 2021-09-08 RX ORDER — CIPROFLOXACIN 500 MG/1
500 TABLET, FILM COATED ORAL 2 TIMES DAILY
Qty: 14 TABLET | Refills: 0 | DISCHARGE
Start: 2021-09-08 | End: 2021-09-14

## 2021-09-08 RX ORDER — MULTIPLE VITAMINS W/ MINERALS TAB 9MG-400MCG
1 TAB ORAL DAILY
DISCHARGE
Start: 2021-09-09

## 2021-09-08 RX ORDER — ACETAMINOPHEN 325 MG/1
650 TABLET ORAL EVERY 6 HOURS PRN
DISCHARGE
Start: 2021-09-08 | End: 2021-09-14 | Stop reason: DRUGHIGH

## 2021-09-08 RX ADMIN — DORZOLAMIDE HYDROCHLORIDE AND TIMOLOL MALEATE 1 DROP: 22.3; 6.8 SOLUTION/ DROPS OPHTHALMIC at 09:01

## 2021-09-08 RX ADMIN — CEFTRIAXONE 2 G: 2 INJECTION, POWDER, FOR SOLUTION INTRAMUSCULAR; INTRAVENOUS at 11:04

## 2021-09-08 RX ADMIN — MULTIPLE VITAMINS W/ MINERALS TAB 1 TABLET: TAB at 09:00

## 2021-09-08 RX ADMIN — PAROXETINE HYDROCHLORIDE 20 MG: 20 TABLET, FILM COATED ORAL at 09:00

## 2021-09-08 RX ADMIN — SODIUM CHLORIDE: 9 INJECTION, SOLUTION INTRAVENOUS at 04:12

## 2021-09-08 ASSESSMENT — ACTIVITIES OF DAILY LIVING (ADL)
ADLS_ACUITY_SCORE: 21
ADLS_ACUITY_SCORE: 28
ADLS_ACUITY_SCORE: 25
ADLS_ACUITY_SCORE: 25

## 2021-09-08 NOTE — PLAN OF CARE
Physical Therapy Discharge Summary    Reason for therapy discharge:    Discharged to transitional care facility.    Progress towards therapy goal(s). See goals on Care Plan in [facility] electronic health record for goal details.  Goals partially met.  Barriers to achieving goals:   discharge from facility.    Therapy recommendation(s):    Continued therapy is recommended.  Rationale/Recommendations:  Continued PT recommended at TCU for further progression of safe and independent functional mobility.

## 2021-09-08 NOTE — PROGRESS NOTES
Care Management Follow Up    Length of Stay (days): 2    Expected Discharge Date: 09/09/2021     Concerns to be Addressed: discharge planning     Patient plan of care discussed at interdisciplinary rounds: Yes    Anticipated Discharge Disposition: Skilled Nursing Facilty     Anticipated Discharge Services: None  Anticipated Discharge DME: None    Patient/family educated on Medicare website which has current facility and service quality ratings: no  Education Provided on the Discharge Plan: yes   Patient/Family in Agreement with the Plan: yes    Referrals Placed by CM/SW:  yes  Private pay costs discussed: transportation costs    Additional Information:  Per MD not possible ready for d.c for today  Referrals have been sent  At this time [facility] has bed but not till tomorrow.9/9. Still waiting on other referrals to respond    September 08th, 2021 at 09:55:18 AM CDT. The confirmation number is [id number]    H/E W/C transport set up for 1300 for tomorrow. This can be changed should we find TCU for today.       Addendum    [facility] would have TCU bed for today if Medically ready.. SW spoke with Daughter Kathy. SHe is familiar with location and ok with either option.  Pt's spouse had previously been at [facility]    Paged MD. Waiting for update on ready for today or tomorrow     Addendum  Moved Transportation to today with H/ E @ 2044          Corinne C. White, LSW

## 2021-09-08 NOTE — PROGRESS NOTES
Pt seen and examined.  Feels well.  No concerns.  Off oxygen    D/w SW and TCU bed available.    Can discharge today

## 2021-09-08 NOTE — PLAN OF CARE
To Do:  End of Shift Summary  For vital signs and complete assessments, please see documentation flowsheets.     Pertinent assessments: Alert to self/situation. VSS. HR: irregular/lc. Total care, turn Q2. Pt is currently drinking more. Pt voided 50 ml via purwick, bladderscanned pt @ 0600 and pt is retaining 117.     Major Shift Events: uneventful    Treatment Plan: Rocephin, IVF. PT/OT/Care Coordinator consulted.    Bedside Nurse: Domonique Ortiz RN

## 2021-09-09 ENCOUNTER — VIRTUAL VISIT (OUTPATIENT)
Dept: GERIATRICS | Facility: CLINIC | Age: 86
End: 2021-09-09
Payer: COMMERCIAL

## 2021-09-09 VITALS
DIASTOLIC BLOOD PRESSURE: 85 MMHG | HEART RATE: 87 BPM | RESPIRATION RATE: 16 BRPM | BODY MASS INDEX: 17.11 KG/M2 | HEIGHT: 67 IN | WEIGHT: 109 LBS | TEMPERATURE: 97.9 F | SYSTOLIC BLOOD PRESSURE: 130 MMHG | OXYGEN SATURATION: 95 %

## 2021-09-09 DIAGNOSIS — N39.0 UTI (URINARY TRACT INFECTION) WITH PYURIA: Primary | ICD-10-CM

## 2021-09-09 DIAGNOSIS — R53.81 PHYSICAL DECONDITIONING: ICD-10-CM

## 2021-09-09 DIAGNOSIS — R41.89 COGNITIVE IMPAIRMENT: ICD-10-CM

## 2021-09-09 DIAGNOSIS — E43 SEVERE MALNUTRITION (H): ICD-10-CM

## 2021-09-09 DIAGNOSIS — F41.1 GENERALIZED ANXIETY DISORDER: ICD-10-CM

## 2021-09-09 PROCEDURE — 99309 SBSQ NF CARE MODERATE MDM 30: CPT | Mod: 95 | Performed by: NURSE PRACTITIONER

## 2021-09-09 PROCEDURE — 86481 TB AG RESPONSE T-CELL SUSP: CPT | Performed by: NURSE PRACTITIONER

## 2021-09-09 PROCEDURE — 99207 PR CDG-CODE INCORRECT PER BILLING BASED ON TIME: CPT | Performed by: NURSE PRACTITIONER

## 2021-09-09 PROCEDURE — 36415 COLL VENOUS BLD VENIPUNCTURE: CPT | Performed by: NURSE PRACTITIONER

## 2021-09-09 PROCEDURE — P9603 ONE-WAY ALLOW PRORATED MILES: HCPCS | Performed by: NURSE PRACTITIONER

## 2021-09-09 ASSESSMENT — MIFFLIN-ST. JEOR: SCORE: 982.05

## 2021-09-09 NOTE — LETTER
"    9/9/2021        RE: Hedy English  [address]  [facility] [address]         [facility]    Hedy English is being evaluated via a billable video.   The patient has been notified of following:  \"This video visit will be conducted via a call between you and your provider. We have found that certain health care needs can be provided without the need for an in-person physical exam.  This service lets us provide the care you need with a video conversation. If during the course of the call the provider feels a video visit is not appropriate, you will not be charged for this service.\"   The provider has received verbal consent for a Video Visit from the patient and or first contact? Yes  Patient/facility staff would like the video invitation sent by: N/A   Video Start Time: 0724  Which Facility the Patient is at during the time of visit: [facility]     PRIMARY CARE PROVIDER AND CLINIC:  Carlito Cardenas [facility], [address] [facility] / [place] [address]  Chief Complaint   Patient presents with     [facility] Medical Record Number:  [id number]  Hedy English  is a 85 year old  (1935), admitted to the above facility from  [facility]. Hospital stay 9/6/21 through 9/8/21. Admitted to this facility for  rehab, medical management and nursing care.    HPI:    HPI information obtained from: facility chart records, facility staff, patient report and [facility] chart review.     Brief Summary of Hospital Course:     PMH: cognitive impairment, anxiety, malnutrition    Patient admitted to [facility] 9/6-9/8/21 due to weakness, decreased intake. UC + > 100K Proteus mirabilis and > 100K E coli. Was treated with rocephin and transitioned to ciprofloxacin upon discharge. Noted to be nonambulatory since 06/2021 with unclear etiology. Also noted to have short-term memory loss/questionable dementia with severe " "malnutrition.     Transferred to [facility] TCU on 9/8/21.       Updates on Status Since Skilled nursing Admission:     During exam, patient seen resting in bed. Reports doing well. States has been bedbound since 06/2021. Endorses variable intake. Admits to urinary frequency, but denies dysuria or urinary urgency. Denies fever, chills. Denies constipation, diarrhea. Denies chest pain, SOB, headache, syncope. PTA lives in mobile home w/son, states goal is to discharge home following TCU stay.  following for discharge planning.         CODE STATUS/ADVANCE DIRECTIVES DISCUSSION:   DNR / DNI  Patient's living condition: lives with adult son, daughter comes over everyday to help out  ALLERGIES: Patient has no known allergies.  PAST MEDICAL HISTORY:  has no past medical history on file.  PAST SURGICAL HISTORY:   has no past surgical history on file.  FAMILY HISTORY: family history is not on file.  SOCIAL HISTORY:     Current Outpatient Medications   Medication Sig Dispense Refill     acetaminophen (TYLENOL) 325 MG tablet Take 2 tablets (650 mg) by mouth every 6 hours as needed for mild pain or other (and adjunct with moderate or severe pain or per patient request)       ciprofloxacin (CIPRO) 500 MG tablet Take 1 tablet (500 mg) by mouth 2 times daily for 7 days 14 tablet 0     dorzolamide-timolol (COSOPT) 2-0.5 % ophthalmic solution PLACE 1 DROP INTO BOTH EYES TWICE DAILY       multivitamin w/minerals (THERA-VIT-M) tablet Take 1 tablet by mouth daily       PARoxetine (PAXIL) 20 MG tablet Take 20 mg by mouth daily        discharge medications reconciled and changed, per note/orders      ROS: 10 point ROS of systems including Constitutional, Eyes, Respiratory, Cardiovascular, Gastroenterology, Genitourinary, Integumentary, Musculoskeletal, Psychiatric were all negative except for pertinent positives noted in my HPI.      Vitals:/85   Pulse 87   Temp 97.9  F (36.6  C)   Resp 16   Ht 1.702 m (5' 7\")   Wt 49.4 kg (109 " lb)   SpO2 95%   BMI 17.07 kg/m     Limited Visit Exam done given COVID-19 precautions:  GENERAL APPEARANCE:  Alert, in no distress, oriented, thin, cooperative  ENT:  Mouth and posterior oropharynx normal, moist mucous membranes, [place]  EYES:  EOM, conjunctivae, lids, pupils and irises normal, PERRL  RESP:  no respiratory distress  CV:  Unable to assess.  M/S:   Gait and station abnormal, resting in bed.   SKIN:  Inspection of skin and subcutaneous tissue baseline  NEURO:   Cranial nerves 2-12 are normal tested and grossly at patient's baseline  PSYCH:  oriented X 3, memory impaired , affect and mood normal    Wt Readings from Last 4 Encounters:   09/09/21 49.4 kg (109 lb)   09/06/21 49.4 kg (109 lb)       Lab/Diagnostic data:  Recent labs in [facility] reviewed by me today.  and   Most Recent 3 CBC's:Recent Labs   Lab Test 09/06/21  1357 09/20/20  1629 02/06/20  1455   WBC 9.5 10.8 8.3   HGB 13.7 12.1 12.3   MCV 98 96 97    358 308     Most Recent 3 BMP's:Recent Labs   Lab Test 09/06/21  1357 09/20/20  1629 02/06/20  1455    136 133   POTASSIUM 4.0 3.9 3.5   CHLORIDE 104 104 101   CO2 24 25 24   BUN 20 25 12   CR 0.92 0.88 1.01   ANIONGAP 9 7 8   LOGAN 8.9 9.6 8.5   * 118* 100*     TSH   Date Value Ref Range Status   09/06/2021 1.81 0.40 - 4.00 mU/L Final         ASSESSMENT/PLAN:    (N39.0) UTI (urinary tract infection) with pyuria  (primary encounter diagnosis)  Comment: UA+, UC 9/6, sensitivity results pending.  Plan:   - Course of Ciprofloxacin to be completed on 9/15  - UC results pending; plan to continue ciprofloxacin unless resistant to UC  - Monitor urine output, encourage fluids  - Patient verbalizes understanding and agrees with treatment plan.   - Reviewed plan with Kathy who verbalizes understanding and agrees with treatment plan    (E43) Severe malnutrition (H)  Comment: Chronic severe malnutrition r/t decreased appetite, inability to care for self at home, bedbound since 06/2021.  Body  mass index is 17.07 kg/m .  Plan:   - Check CMP 9/13 dx malnutrition  - Dietary following  - Check weights three times/week  - Monitor intake  - Patient verbalizes understanding and agrees with treatment plan.   - Reviewed plan with Kathy who verbalizes understanding and agrees with treatment plan    (F41.1) Generalized anxiety disorder  Comment: Chronic  Plan:   - Continue paxil  - Monitor changes in mood or behaviors    (R41.89) Cognitive impairment  (R53.81) Physical deconditioning  Comment: Chronic cognitive impairment, concern r/t short term memory loss vs dementia. Has not had formal cognitive testing. Ongoing physical deconditioning since 06/2021, has been primarily nonambulatory since then.  Plan:  - Add APM due to immobility   - ST to eval/treat cognition   - Encourage active participation in therapy session to increase strength and promote independence in activities and ADLs.   - SW following for discharge planning. Discussed per hospital notes, concern at home regarding assistance with meals, ADLs and medication administration; lives with son who also has cognitive impairment. Will likely need caregiver training and additional resources upon discharge.  - Patient verbalizes understanding and agrees with treatment plan. Goal is to discharge home.   - Patient confirms code status as DNR/DNI  - Reviewed plan with Kathy who verbalizes understanding and agrees with treatment plan            Total time spent with patient visit at the skilled nursing facility was 36 mins including patient visit and review of past records. Greater than 50% of total time (23 minutes) spent with counseling patient regarding treatment plan, medication management, follow-up labs, and follow-up appointments. Patient verbalizes understanding and agrees with treatment plan. Coordinating care with SW regarding discharge planning.     Electronically signed by:  PRASHANT Solano CNP     Video-Visit Details  Type of service:   Video Visit  Video End Time (time video stopped): 0730  Distant Location (provider location):  [facility]                   Sincerely,        PRASHANT Solano CNP

## 2021-09-09 NOTE — PROGRESS NOTES
" [facility]    Hedy English is being evaluated via a billable video.   The patient has been notified of following:  \"This video visit will be conducted via a call between you and your provider. We have found that certain health care needs can be provided without the need for an in-person physical exam.  This service lets us provide the care you need with a video conversation. If during the course of the call the provider feels a video visit is not appropriate, you will not be charged for this service.\"   The provider has received verbal consent for a Video Visit from the patient and or first contact? Yes  Patient/facility staff would like the video invitation sent by: N/A   Video Start Time: 0724  Which Facility the Patient is at during the time of visit: [facility]     PRIMARY CARE PROVIDER AND CLINIC:  Carlito Cardenas [facility], [address] [facility] / [place] [address]  Chief Complaint   Patient presents with     hospitals Care     [place] Medical Record Number:  [id number]  Hedy English  is a 85 year old  (1935), admitted to the above facility from  [facility]. Hospital stay 9/6/21 through 9/8/21. Admitted to this facility for  rehab, medical management and nursing care.    HPI:    HPI information obtained from: facility chart records, facility staff, patient report and [facility] chart review.     Brief Summary of Hospital Course:     PMH: cognitive impairment, anxiety, malnutrition    Patient admitted to [facility] 9/6-9/8/21 due to weakness, decreased intake. UC + > 100K Proteus mirabilis and > 100K E coli. Was treated with rocephin and transitioned to ciprofloxacin upon discharge. Noted to be nonambulatory since 06/2021 with unclear etiology. Also noted to have short-term memory loss/questionable dementia with severe malnutrition.     Transferred to [facility]CU on 9/8/21.       Updates on Status Since Skilled nursing Admission: " "    During exam, patient seen resting in bed. Reports doing well. States has been bedbound since 06/2021. Endorses variable intake. Admits to urinary frequency, but denies dysuria or urinary urgency. Denies fever, chills. Denies constipation, diarrhea. Denies chest pain, SOB, headache, syncope. PTA lives in mobile home w/son, states goal is to discharge home following TCU stay.  following for discharge planning.         CODE STATUS/ADVANCE DIRECTIVES DISCUSSION:   DNR / DNI  Patient's living condition: lives with adult son, daughter comes over everyday to help out  ALLERGIES: Patient has no known allergies.  PAST MEDICAL HISTORY:  has no past medical history on file.  PAST SURGICAL HISTORY:   has no past surgical history on file.  FAMILY HISTORY: family history is not on file.  SOCIAL HISTORY:     Current Outpatient Medications   Medication Sig Dispense Refill     acetaminophen (TYLENOL) 325 MG tablet Take 2 tablets (650 mg) by mouth every 6 hours as needed for mild pain or other (and adjunct with moderate or severe pain or per patient request)       ciprofloxacin (CIPRO) 500 MG tablet Take 1 tablet (500 mg) by mouth 2 times daily for 7 days 14 tablet 0     dorzolamide-timolol (COSOPT) 2-0.5 % ophthalmic solution PLACE 1 DROP INTO BOTH EYES TWICE DAILY       multivitamin w/minerals (THERA-VIT-M) tablet Take 1 tablet by mouth daily       PARoxetine (PAXIL) 20 MG tablet Take 20 mg by mouth daily        discharge medications reconciled and changed, per note/orders      ROS: 10 point ROS of systems including Constitutional, Eyes, Respiratory, Cardiovascular, Gastroenterology, Genitourinary, Integumentary, Musculoskeletal, Psychiatric were all negative except for pertinent positives noted in my HPI.      Vitals:/85   Pulse 87   Temp 97.9  F (36.6  C)   Resp 16   Ht 1.702 m (5' 7\")   Wt 49.4 kg (109 lb)   SpO2 95%   BMI 17.07 kg/m     Limited Visit Exam done given COVID-19 precautions:  GENERAL APPEARANCE:  " Alert, in no distress, oriented, thin, cooperative  ENT:  Mouth and posterior oropharynx normal, moist mucous membranes, [place]  EYES:  EOM, conjunctivae, lids, pupils and irises normal, PERRL  RESP:  no respiratory distress  CV:  Unable to assess.  M/S:   Gait and station abnormal, resting in bed.   SKIN:  Inspection of skin and subcutaneous tissue baseline  NEURO:   Cranial nerves 2-12 are normal tested and grossly at patient's baseline  PSYCH:  oriented X 3, memory impaired , affect and mood normal    Wt Readings from Last 4 Encounters:   09/09/21 49.4 kg (109 lb)   09/06/21 49.4 kg (109 lb)       Lab/Diagnostic data:  Recent labs in [facility] reviewed by me today.  and   Most Recent 3 CBC's:Recent Labs   Lab Test 09/06/21  1357 09/20/20  1629 02/06/20  1455   WBC 9.5 10.8 8.3   HGB 13.7 12.1 12.3   MCV 98 96 97    358 308     Most Recent 3 BMP's:Recent Labs   Lab Test 09/06/21  1357 09/20/20  1629 02/06/20  1455    136 133   POTASSIUM 4.0 3.9 3.5   CHLORIDE 104 104 101   CO2 24 25 24   BUN 20 25 12   CR 0.92 0.88 1.01   ANIONGAP 9 7 8   LOGAN 8.9 9.6 8.5   * 118* 100*     TSH   Date Value Ref Range Status   09/06/2021 1.81 0.40 - 4.00 mU/L Final         ASSESSMENT/PLAN:    (N39.0) UTI (urinary tract infection) with pyuria  (primary encounter diagnosis)  Comment: UA+, UC 9/6, sensitivity results pending.  Plan:   - Course of Ciprofloxacin to be completed on 9/15  - UC results pending; plan to continue ciprofloxacin unless resistant to UC  - Monitor urine output, encourage fluids  - Patient verbalizes understanding and agrees with treatment plan.   - Reviewed plan with Kathy who verbalizes understanding and agrees with treatment plan    (E43) Severe malnutrition (H)  Comment: Chronic severe malnutrition r/t decreased appetite, inability to care for self at home, bedbound since 06/2021.  Body mass index is 17.07 kg/m .  Plan:   - Check CMP 9/13 dx malnutrition  - Dietary following  - Check weights three  times/week  - Monitor intake  - Patient verbalizes understanding and agrees with treatment plan.   - Reviewed plan with Kathy who verbalizes understanding and agrees with treatment plan    (F41.1) Generalized anxiety disorder  Comment: Chronic  Plan:   - Continue paxil  - Monitor changes in mood or behaviors    (R41.89) Cognitive impairment  (R53.81) Physical deconditioning  Comment: Chronic cognitive impairment, concern r/t short term memory loss vs dementia. Has not had formal cognitive testing. Ongoing physical deconditioning since 06/2021, has been primarily nonambulatory since then.  Plan:  - Add APM due to immobility   - ST to eval/treat cognition   - Encourage active participation in therapy session to increase strength and promote independence in activities and ADLs.   - SW following for discharge planning. Discussed per hospital notes, concern at home regarding assistance with meals, ADLs and medication administration; lives with son who also has cognitive impairment. Will likely need caregiver training and additional resources upon discharge.  - Patient verbalizes understanding and agrees with treatment plan. Goal is to discharge home.   - Patient confirms code status as DNR/DNI  - Reviewed plan with Kathy who verbalizes understanding and agrees with treatment plan            Total time spent with patient visit at the [facility] nursing facility was 36 mins including patient visit and review of past records. Greater than 50% of total time (23 minutes) spent with counseling patient regarding treatment plan, medication management, follow-up labs, and follow-up appointments. Patient verbalizes understanding and agrees with treatment plan. Coordinating care with SW regarding discharge planning.     Electronically signed by:  PRASHANT Solano CNP     Video-Visit Details  Type of service:  Video Visit  Video End Time (time video stopped): 0730  Distant Location (provider location):  [facility]  SERVICES

## 2021-09-09 NOTE — PROGRESS NOTES
" [place] [facility]  Hedy English is being evaluated via a billable video.   The patient has been notified of following:  ***\"This video visit will be conducted via a call between you and your provider. We have found that certain health care needs can be provided without the need for an in-person physical exam.  This service lets us provide the care you need with a video conversation. If during the course of the call the provider feels a video visit is not appropriate, you will not be charged for this service.\"   The provider has received verbal consent for a Video Visit from the patient and or first contact? {YES-NO  Default Yes:4444::\"Yes\"}  Patient/facility staff would like the video invitation sent by: {fgs video visit invitation:503432::\"N/A \"}  Video Start Time: {video visit start time:152948}  Which Facility the Patient is at during the time of visit: {FGS homes all regions:629118}  {if you used Care Everywhere, delete this and enter \".fgscarewhere\"}  PRIMARY CARE PROVIDER AND CLINIC:  [facility], [address] [facility] / [facility] [address]  Chief Complaint   Patient presents with     [facility] Medical Record Number:  [id number]  Hedy English  is a 85 year old  (1935), {fgsinitialoption:842636}.  Admitted to this facility for  {FGS ADMISSION REASONS:420631}.  HPI:    HPI information obtained from: {FGS HPI:779345}.   Brief Summary of Hospital Course: ***  Updates on Status Since Skilled nursing Admission: ***    CODE STATUS/ADVANCE DIRECTIVES DISCUSSION:   {CODE STATUS:133630}  Patient's living condition: {LIVES WITH (NURSING HOME):359588}  ALLERGIES: Patient has no known allergies.  PAST MEDICAL HISTORY:  has no past medical history on file.  PAST SURGICAL HISTORY:   has no past surgical history on file.  FAMILY HISTORY: family history is not on file.  SOCIAL HISTORY:     Current Outpatient Medications   Medication Sig Dispense Refill     " "acetaminophen (TYLENOL) 325 MG tablet Take 2 tablets (650 mg) by mouth every 6 hours as needed for mild pain or other (and adjunct with moderate or severe pain or per patient request)       ciprofloxacin (CIPRO) 500 MG tablet Take 1 tablet (500 mg) by mouth 2 times daily for 7 days 14 tablet 0     dorzolamide-timolol (COSOPT) 2-0.5 % ophthalmic solution PLACE 1 DROP INTO BOTH EYES TWICE DAILY       multivitamin w/minerals (THERA-VIT-M) tablet Take 1 tablet by mouth daily       PARoxetine (PAXIL) 20 MG tablet Take 20 mg by mouth daily      ***  {ACO Med Rec (Provider):018218}  {Providers Please double check the med list (in the plan section >> meds & orders tab) and Discontinue any of the meds flagged by the TC to be discontinued}  ROS: {ROS FGS:346360}  Vitals:/85   Pulse 87   Temp 97.9  F (36.6  C)   Resp 16   Ht 1.702 m (5' 7\")   Wt 49.4 kg (109 lb)   SpO2 95%   BMI 17.07 kg/m     Limited Visit Exam done given COVID-19 precautions:  {Nursing home physical exam :172034}    Lab/Diagnostic data:  {fgslab:126267}    ASSESSMENT/PLAN:  {FGS DX INITIAL:606504}    {fgsorders:249009}  ***    Electronically signed by:  PRASHANT Solano CNP ***    Video-Visit Details  Type of service:  {fgsetype:232295}  Video End Time (time video stopped): ***  Distant Location (provider location):  [facility]             "

## 2021-09-09 NOTE — PLAN OF CARE
Occupational Therapy Discharge Summary    Reason for therapy discharge:    Discharged to transitional care facility.    Progress towards therapy goal(s). See goals on Care Plan in [facility] electronic health record for goal details.  Goals not met.  Barriers to achieving goals:   discharge from facility.    Therapy recommendation(s):    Continued therapy is recommended.  Rationale/Recommendations:  OT eval and treat at TCU.      **Pt not seen by discharging therapist on this date, note written based on previous treating therapist's notes and recommendations

## 2021-09-09 NOTE — DISCHARGE SUMMARY
Service Date: 09/08/2021  Discharge Date: 09/08/2021    PRINCIPAL FINAL DIAGNOSES:    1.  Urinary tract infection.  2.  Generalized weakness and deconditioning.  The patient has been nonambulatory since 06/2021 for unclear reasons, which family attributes to being homebound due to current COVID pandemic.  Being discharged to transitional care unit.  3.  Short-term memory loss without formal diagnosis of dementia previously, with family reporting progressive symptoms.  4.  Anxiety history.  5.  Severe malnutrition with declining weight.  Appreciate Nutrition input this admission.  6.  Dehydration.    PRINCIPAL PROCEDURES THIS ADMISSION:    1.  Urine culture that is pending.  Notably, culture was obtained after antibiotics were initiated.  So despite likely UTI, culture may be negative.  2.  COVID-19 that was negative.  3.  Blood cultures that are without growth.  4.  Chest x-ray.    REASON FOR ADMISSION:  Please see dictated History and Physical.  In brief, Ms. English is an 85-year-old female who presented to the hospital for concerns about weakness, decreased oral intake and malodorous urine.  She was found to have evidence of UTI.    HOSPITAL COURSE:    1.  UTI:  Patient was treated with IV Rocephin while hospitalized.  She did not appear septic or toxic while in the hospital.  Her urine culture was obtained after antibiotics were initiated, so may be negative despite having a bladder infection.  She was treated with Rocephin while hospitalized and being discharged on oral fluoroquinolone to complete therapy.  2.  Deconditioning and weakness:  Family reports that the patient has not ambulated since 06/2021.  They attribute this to patient being homebound due to COVID pandemic.  The patient was seen by Rehabilitation Therapy.  She is being discharged to a TCU.    DISCHARGE MEDICATIONS:    1.  Acetaminophen as needed for pain.  2.  Ciprofloxacin 500 mg twice a day for 7 days for UTI.  3.  Cosopt 1 drop into both eyes  twice a day.  4.  Multivitamin daily.  5.  Paxil 20 mg daily.    FOLLOWUP INSTRUCTIONS:  See primary MD in 1-2 weeks after leaving rehabilitation center for routine followup.    I examined the patient on the day of discharge.    I spent 33 minutes on this patient today, including physical exam, answering questions, discussing discharge plans with Social Work, ordering discharge instructions, and performing discharge dictation.    Santiago Salter MD        D: 2021   T: 2021   MT: [facility]    Name:     INESSA BAILEY  MRN:      [id number]        Account:      [id number]   :      1935           Service Date: 2021                                  Discharge Date: 2021     Document: [id number]

## 2021-09-10 ENCOUNTER — DOCUMENTATION ONLY (OUTPATIENT)
Dept: OTHER | Facility: CLINIC | Age: 86
End: 2021-09-10

## 2021-09-10 LAB
BACTERIA UR CULT: ABNORMAL
GAMMA INTERFERON BACKGROUND BLD IA-ACNC: 0.04 IU/ML
M TB IFN-G BLD-IMP: ABNORMAL
M TB IFN-G CD4+ BCKGRND COR BLD-ACNC: 0.22 IU/ML
MITOGEN IGNF BCKGRD COR BLD-ACNC: 0.01 IU/ML
MITOGEN IGNF BCKGRD COR BLD-ACNC: 0.01 IU/ML
QUANTIFERON MITOGEN: 0.26 IU/ML
QUANTIFERON NIL TUBE: 0.04 IU/ML
QUANTIFERON TB1 TUBE: 0.05 IU/ML
QUANTIFERON TB2 TUBE: 0.05

## 2021-09-10 ASSESSMENT — MIFFLIN-ST. JEOR
SCORE: 1036.48
SCORE: 1036.48

## 2021-09-11 LAB
BACTERIA BLD CULT: NO GROWTH
BACTERIA BLD CULT: NO GROWTH

## 2021-09-12 ENCOUNTER — LAB REQUISITION (OUTPATIENT)
Dept: LAB | Facility: CLINIC | Age: 86
End: 2021-09-12

## 2021-09-12 DIAGNOSIS — E46 UNSPECIFIED PROTEIN-CALORIE MALNUTRITION (H): ICD-10-CM

## 2021-09-13 LAB
ALBUMIN SERPL-MCNC: 1.5 G/DL (ref 3.4–5)
ALP SERPL-CCNC: 192 U/L (ref 40–150)
ALT SERPL W P-5'-P-CCNC: 62 U/L (ref 0–50)
ANION GAP SERPL CALCULATED.3IONS-SCNC: 10 MMOL/L (ref 3–14)
AST SERPL W P-5'-P-CCNC: 83 U/L (ref 0–45)
BILIRUB SERPL-MCNC: 0.6 MG/DL (ref 0.2–1.3)
BUN SERPL-MCNC: 11 MG/DL (ref 7–30)
CALCIUM SERPL-MCNC: 7.9 MG/DL (ref 8.5–10.1)
CHLORIDE BLD-SCNC: 104 MMOL/L (ref 94–109)
CO2 SERPL-SCNC: 21 MMOL/L (ref 20–32)
CREAT SERPL-MCNC: 0.66 MG/DL (ref 0.52–1.04)
GFR SERPL CREATININE-BSD FRML MDRD: 82 ML/MIN/1.73M2
GLUCOSE BLD-MCNC: 78 MG/DL (ref 70–99)
POTASSIUM BLD-SCNC: 3.6 MMOL/L (ref 3.4–5.3)
PROT SERPL-MCNC: 5.8 G/DL (ref 6.8–8.8)
SODIUM SERPL-SCNC: 135 MMOL/L (ref 133–144)

## 2021-09-13 PROCEDURE — 82040 ASSAY OF SERUM ALBUMIN: CPT | Performed by: NURSE PRACTITIONER

## 2021-09-13 PROCEDURE — 36415 COLL VENOUS BLD VENIPUNCTURE: CPT | Performed by: NURSE PRACTITIONER

## 2021-09-14 ENCOUNTER — LAB REQUISITION (OUTPATIENT)
Dept: LAB | Facility: CLINIC | Age: 86
End: 2021-09-14

## 2021-09-14 ENCOUNTER — VIRTUAL VISIT (OUTPATIENT)
Dept: GERIATRICS | Facility: CLINIC | Age: 86
End: 2021-09-14
Payer: COMMERCIAL

## 2021-09-14 VITALS
SYSTOLIC BLOOD PRESSURE: 104 MMHG | DIASTOLIC BLOOD PRESSURE: 65 MMHG | BODY MASS INDEX: 18.99 KG/M2 | TEMPERATURE: 97.7 F | HEIGHT: 67 IN | WEIGHT: 121 LBS | RESPIRATION RATE: 18 BRPM | HEART RATE: 73 BPM | OXYGEN SATURATION: 97 %

## 2021-09-14 DIAGNOSIS — E46 UNSPECIFIED PROTEIN-CALORIE MALNUTRITION (H): ICD-10-CM

## 2021-09-14 DIAGNOSIS — M54.6 ACUTE BILATERAL THORACIC BACK PAIN: ICD-10-CM

## 2021-09-14 DIAGNOSIS — N39.0 UTI (URINARY TRACT INFECTION) WITH PYURIA: Primary | ICD-10-CM

## 2021-09-14 DIAGNOSIS — N39.0 URINARY TRACT INFECTION, SITE NOT SPECIFIED: ICD-10-CM

## 2021-09-14 DIAGNOSIS — E43 SEVERE MALNUTRITION (H): ICD-10-CM

## 2021-09-14 DIAGNOSIS — R41.89 COGNITIVE IMPAIRMENT: ICD-10-CM

## 2021-09-14 DIAGNOSIS — K59.01 SLOW TRANSIT CONSTIPATION: ICD-10-CM

## 2021-09-14 DIAGNOSIS — R53.81 PHYSICAL DECONDITIONING: ICD-10-CM

## 2021-09-14 PROCEDURE — 99310 SBSQ NF CARE HIGH MDM 45: CPT | Performed by: NURSE PRACTITIONER

## 2021-09-14 RX ORDER — ACETAMINOPHEN 325 MG/1
650 TABLET ORAL 4 TIMES DAILY
COMMUNITY
End: 2021-09-16

## 2021-09-14 RX ORDER — CIPROFLOXACIN 500 MG/1
500 TABLET, FILM COATED ORAL 2 TIMES DAILY
COMMUNITY
Start: 2021-09-08 | End: 2021-09-15

## 2021-09-14 NOTE — PROGRESS NOTES
"[place] [facility]     Hedy English is being evaluated via a billable video visit.   The patient has been notified of following:  \"This video visit will be conducted via a call between you and your provider. We have found that certain health care needs can be provided without the need for an in-person physical exam.  This service lets us provide the care you need with a video conversation. If during the course of the call the provider feels a video visit is not appropriate, you will not be charged for this service.\"   The provider has received verbal consent for a Video Visit from the patient or first contact? Yes  Patient  or facility staff would like the video invitation sent by: N/A   Video Start Time: 0809    [place] Medical Record Number:  [id number]  Place of Location at the time of visit: [facility]   Chief Complaint   Patient presents with     RECHECK     Back Pain     HPI:  Hedy English  is a 85 year old (1935), who is being seen today for a visit.  HPI information obtained from: facility chart records, facility staff, patient report and [facility] chart review.     PMH: cognitive impairment, anxiety, malnutrition     Patient admitted to [facility] 9/6-9/8/21 due to weakness, decreased intake. UC + > 100K Proteus mirabilis and > 100K E coli. Was treated with rocephin and transitioned to ciprofloxacin upon discharge. Noted to be nonambulatory since 06/2021 with unclear etiology. Also noted to have short-term memory loss/questionable dementia with severe malnutrition.      Transferred to [facility] on 9/8/21.       Today's concern is:    During exam, patient seen sitting in wheelchair. Patient tearful during visit due to uncontrolled upper back pain, wanting to go back to bed. Unsure if has been taking tylenol PRN, would be open to trying warm blanket and scheduled tylenol. SW at bedside, reports patient does have APM. Endorses limited appetite, denies abdominal pain " "or nausea. Denies constipation, diarrhea. Denies chest pain, SOB, headache, syncope. SW following for discharge planning.         Past Medical and Surgical History reviewed in Epic today.  MEDICATIONS:    Current Outpatient Medications   Medication Sig Dispense Refill     acetaminophen (TYLENOL) 325 MG tablet Take 650 mg by mouth 4 times daily And take 650mg at bedtime PRN       ciprofloxacin (CIPRO) 500 MG tablet Take 500 mg by mouth 2 times daily       diclofenac (VOLTAREN) 1 % topical gel Apply topically 4 times daily       dorzolamide-timolol (COSOPT) 2-0.5 % ophthalmic solution PLACE 1 DROP INTO BOTH EYES TWICE DAILY       multivitamin w/minerals (THERA-VIT-M) tablet Take 1 tablet by mouth daily       PARoxetine (PAXIL) 20 MG tablet Take 20 mg by mouth daily         REVIEW OF SYSTEMS: 4 point ROS including Respiratory, CV, GI and , other than that noted in the HPI,  is negative      Objective: /65   Pulse 73   Temp 97.7  F (36.5  C)   Resp 18   Ht 1.702 m (5' 7\")   Wt 54.9 kg (121 lb)   SpO2 97%   BMI 18.95 kg/m    Limited visit exam done given COVID-19 precautions.   GENERAL APPEARANCE:  Alert, in no distress, oriented, thin, cooperative  ENT:  Mouth and posterior oropharynx normal, moist mucous membranes, [place]  EYES:  EOM, conjunctivae, lids, pupils and irises normal, PERRL  RESP:  no respiratory distress  CV:  Unable to assess.  M/S:   Gait and station abnormal, resting in bed.   SKIN:  Inspection of skin and subcutaneous tissue baseline  NEURO:   Cranial nerves 2-12 are normal tested and grossly at patient's baseline  PSYCH:  oriented X 3, memory impaired , affect and mood normal    Wt Readings from Last 4 Encounters:   09/10/21 54.9 kg (121 lb)   09/09/21 49.4 kg (109 lb)   09/06/21 49.4 kg (109 lb)       Labs:   Labs done in SNF are in [facility]. Please refer to them using [employer]/Care Everywhere., Recent labs in EPIC reviewed by me today.  and   Most Recent 3 CBC's:  Recent Labs   Lab Test " 09/06/21  1357 09/20/20  1629 02/06/20  1455   WBC 9.5 10.8 8.3   HGB 13.7 12.1 12.3   MCV 98 96 97    358 308     Most Recent 3 BMP's:  Recent Labs   Lab Test 09/13/21  0540 09/06/21  1357 09/20/20  1629    137 136   POTASSIUM 3.6 4.0 3.9   CHLORIDE 104 104 104   CO2 21 24 25   BUN 11 20 25   CR 0.66 0.92 0.88   ANIONGAP 10 9 7   LOGAN 7.9* 8.9 9.6   GLC 78 159* 118*     Lab Results   Component Value Date    ALBUMIN 1.5 09/13/2021    ALBUMIN 4.2 11/23/2007     TSH   Date Value Ref Range Status   09/06/2021 1.81 0.40 - 4.00 mU/L Final       UC 9/6/21:             ASSESSMENT/PLAN:    (N39.0) UTI (urinary tract infection) with pyuria  (primary encounter diagnosis)  Comment: UC 9/6 + > 100K Proteus mirabilis, > 100K E coli; both sensitive to Ciprofloxacin.  Plan:   - Course of ciprofloxacin to be completed on 9/15   - Check PVR, update provider if PVR > 300cc   - Monitor urine output  - Encourage fluids  - Patient verbalizes understanding and agrees with treatment plan.     (M54.6) Acute bilateral thoracic back pain  Comment: Acute back pain likely r/t immobility exacerbated by increase in activity level at TCU  Plan:  - Discontinue current acetaminophen orders  - Add acetaminophen 650mg QID dx pain  - Add acetaminophen 650mg at bedtime PRN dx pain  - Add voltaren gel 1%, apply 2g to low back QID x 14 days dx low back pain  - Apply warm blanket to back TID x 1 week   - Patient verbalizes understanding and agrees with treatment plan.     (E43) Severe malnutrition (H)  Comment: Chronic severe malnutrition r/t decreased appetite, inability to care for self at home, bedbound since 06/2021.  Body mass index is 18.95 kg/m . Noted to have 0-50% intake w/meals. RN reports limited intake today with increased fatigue/weakness.  Plan:   - Check CBC & BMP 9/15 dx malnutrition, UTI  - Check BMP 9/20 dx malnutrition  - Start calorie counts every day x 5 days  - Dietician following  - Check weights three times/week  -  Encourage fluids 8oz with med passes and between meals  - RN to update provider if vital signs or patient's status declines.  - Patient verbalizes understanding and agrees with treatment plan.     (R41.89) Cognitive impairment  (R53.81) Physical deconditioning  Comment: Chronic cognitive impairment, concern r/t short term memory loss vs dementia. Has not had formal cognitive testing. Ongoing physical deconditioning since 06/2021, has been primarily nonambulatory since then. PTA was living with son. Nonambulatory. Requires assist x 2 with agnes lift. Requires max assist with toileting, dressing.  Plan:  - Encourage active participation in therapy session to increase strength and promote independence in activities and ADLs.   - Cognitive testing to be done in therapy.  - SW following for discharge planning. Unclear discharge goal.   - Patient verbalizes understanding and agrees with treatment plan.     (K59.01) Slow transit constipation  Comment: Uncontrolled, RN reports unclear last bowel movement. Has been > 3 days since last BM.  Plan:   - Give senna-s 2 tablets now  - Add senna-s 1 tablet BID  - Give bisacodyl suppository 10mg this evening if no BM results  - Monitor BM  - Reviewed changes to treatment plan with RN        Total time spent with patient visit at the skilled nursing facility was 38 mins including patient visit and review of past records. Greater than 50% of total time (22 minutes) spent with counseling patient regarding treatment plan, medication management, follow-up labs, and follow-up appointments. Patient verbalizes understanding and agrees with treatment plan. Coordinating care with RN regarding changes in patient status and treatment plan.     Electronically signed by:  PRASHANT Solano CNP     Video-Visit Details  Type of service:  Video Visit  Video End Time (time video stopped): 0814  Distant Location (provider location):  [facility]

## 2021-09-15 ENCOUNTER — DOCUMENTATION ONLY (OUTPATIENT)
Dept: GERIATRICS | Facility: CLINIC | Age: 86
End: 2021-09-15
Payer: COMMERCIAL

## 2021-09-15 DIAGNOSIS — R41.89 COGNITIVE IMPAIRMENT: ICD-10-CM

## 2021-09-15 DIAGNOSIS — K59.01 SLOW TRANSIT CONSTIPATION: ICD-10-CM

## 2021-09-15 DIAGNOSIS — E55.9 VITAMIN D DEFICIENCY: ICD-10-CM

## 2021-09-15 DIAGNOSIS — N39.0 UTI (URINARY TRACT INFECTION) WITH PYURIA: Primary | ICD-10-CM

## 2021-09-15 DIAGNOSIS — R53.81 PHYSICAL DECONDITIONING: ICD-10-CM

## 2021-09-15 DIAGNOSIS — E43 SEVERE MALNUTRITION (H): ICD-10-CM

## 2021-09-15 DIAGNOSIS — D64.9 ANEMIA, UNSPECIFIED TYPE: ICD-10-CM

## 2021-09-15 DIAGNOSIS — R74.8 ELEVATED LIVER ENZYMES: ICD-10-CM

## 2021-09-15 LAB
ANION GAP SERPL CALCULATED.3IONS-SCNC: 10 MMOL/L (ref 3–14)
BUN SERPL-MCNC: 19 MG/DL (ref 7–30)
CALCIUM SERPL-MCNC: 8.4 MG/DL (ref 8.5–10.1)
CHLORIDE BLD-SCNC: 103 MMOL/L (ref 94–109)
CO2 SERPL-SCNC: 22 MMOL/L (ref 20–32)
CREAT SERPL-MCNC: 1.14 MG/DL (ref 0.52–1.04)
ERYTHROCYTE [DISTWIDTH] IN BLOOD BY AUTOMATED COUNT: 18.2 % (ref 10–15)
GFR SERPL CREATININE-BSD FRML MDRD: 45 ML/MIN/1.73M2
GLUCOSE BLD-MCNC: 58 MG/DL (ref 70–99)
HCT VFR BLD AUTO: 31.6 % (ref 35–47)
HGB BLD-MCNC: 10.4 G/DL (ref 11.7–15.7)
MCH RBC QN AUTO: 32.1 PG (ref 26.5–33)
MCHC RBC AUTO-ENTMCNC: 32.9 G/DL (ref 31.5–36.5)
MCV RBC AUTO: 98 FL (ref 78–100)
PLATELET # BLD AUTO: 430 10E3/UL (ref 150–450)
POTASSIUM BLD-SCNC: 3.6 MMOL/L (ref 3.4–5.3)
RBC # BLD AUTO: 3.24 10E6/UL (ref 3.8–5.2)
SODIUM SERPL-SCNC: 135 MMOL/L (ref 133–144)
WBC # BLD AUTO: 12 10E3/UL (ref 4–11)

## 2021-09-15 PROCEDURE — 36415 COLL VENOUS BLD VENIPUNCTURE: CPT | Performed by: NURSE PRACTITIONER

## 2021-09-15 PROCEDURE — 99358 PROLONG SERVICE W/O CONTACT: CPT | Performed by: NURSE PRACTITIONER

## 2021-09-15 PROCEDURE — 80048 BASIC METABOLIC PNL TOTAL CA: CPT | Performed by: NURSE PRACTITIONER

## 2021-09-15 PROCEDURE — 85027 COMPLETE CBC AUTOMATED: CPT | Performed by: NURSE PRACTITIONER

## 2021-09-15 RX ORDER — SENNA AND DOCUSATE SODIUM 50; 8.6 MG/1; MG/1
1 TABLET, FILM COATED ORAL 2 TIMES DAILY
COMMUNITY
Start: 2021-09-15

## 2021-09-15 NOTE — PROGRESS NOTES
"[place] [facility]     Hedy English is being evaluated via a billable video visit.   The patient has been notified of following:  \"This video visit will be conducted via a call between you and your provider. We have found that certain health care needs can be provided without the need for an in-person physical exam.  This service lets us provide the care you need with a video conversation. If during the course of the call the provider feels a video visit is not appropriate, you will not be charged for this service.\"   The provider has received verbal consent for a Video Visit from the patient or first contact? Yes  Patient  or facility staff would like the video invitation sent by: N/A   Video Start Time: 1033    [place] Medical Record Number:  [id number]  Place of Location at the time of visit: [facility]   Chief Complaint   Patient presents with     RECHECK     HPI:  Hedy English  is a 85 year old (1935), who is being seen today for a visit.  HPI information obtained from: facility chart records, facility staff, patient report and [facility] chart review.       PMH: cognitive impairment, anxiety, malnutrition     Patient admitted to [facility] 9/6-9/8/21 due to weakness, decreased intake. UC + > 100K Proteus mirabilis and > 100K E coli. Was treated with rocephin and transitioned to ciprofloxacin upon discharge. Noted to be nonambulatory since 06/2021 with unclear etiology. Also noted to have short-term memory loss/questionable dementia with severe malnutrition.      [place] [facility] [place] [facility]8/21.       Today's concern is:    During exam, patient seen resting in bed. Reports doing better today. Has been participating in therapy, states getting stronger. Endorses having improved appetite w/meals. States back pain controlled. Denies dysuria, urinary frequency or urgency. Denies constipation, diarrhea. Denies coughing, wheezing, chest pain, SOB, headache, " "syncope.    Per nursing staff, patient's intake has improved since 9/15 approx 25-50% of meals. Also reports patient is more alert and less fatigued today. Afebrile.        Past Medical and Surgical History reviewed in Epic today.  MEDICATIONS:    Current Outpatient Medications   Medication Sig Dispense Refill     acetaminophen (TYLENOL) 325 MG tablet Take 650 mg by mouth 4 times daily And take 650mg at bedtime PRN       diclofenac (VOLTAREN) 1 % topical gel Apply topically 4 times daily       dorzolamide-timolol (COSOPT) 2-0.5 % ophthalmic solution PLACE 1 DROP INTO BOTH EYES TWICE DAILY       multivitamin w/minerals (THERA-VIT-M) tablet Take 1 tablet by mouth daily       PARoxetine (PAXIL) 20 MG tablet Take 20 mg by mouth daily       SENNA-docusate sodium (SENNA S) 8.6-50 MG tablet Take 1 tablet by mouth 2 times daily           REVIEW OF SYSTEMS: 4 point ROS including Respiratory, CV, GI and , other than that noted in the HPI,  is negative      Objective: /70   Pulse 78   Temp 97.7  F (36.5  C)   Resp 18   Ht 1.702 m (5' 7\")   Wt 54.9 kg (121 lb)   SpO2 98%   BMI 18.95 kg/m    Limited visit exam done given COVID-19 precautions.   GENERAL APPEARANCE:  Alert, in no distress, oriented, thin, cooperative  ENT:  Mouth and posterior oropharynx normal, moist mucous membranes, [place]  EYES:  EOM, conjunctivae, lids, pupils and irises normal, PERRL  RESP:  no respiratory distress  CV:  Unable to assess.  M/S:   Gait and station abnormal, resting in bed.   SKIN:  Inspection of skin and subcutaneous tissue baseline  NEURO:   Cranial nerves 2-12 are normal tested and grossly at patient's baseline  PSYCH:  oriented X 3, memory impaired , affect and mood normal    Wt Readings from Last 4 Encounters:   09/10/21 54.9 kg (121 lb)   09/10/21 54.9 kg (121 lb)   09/09/21 49.4 kg (109 lb)   09/06/21 49.4 kg (109 lb)       Labs:   Labs done in SNF are in [facility]. Please refer to them using [employer]/Care Everywhere., " Recent labs in [facility] reviewed by me today.   Most Recent 3 CBC's:Recent Labs   Lab Test 09/15/21  0543 09/06/21  1357 09/20/20  1629   WBC 12.0* 9.5 10.8   HGB 10.4* 13.7 12.1   MCV 98 98 96    306 358     Most Recent 3 BMP's:Recent Labs   Lab Test 09/15/21  0543 09/13/21  0540 09/06/21  1357    135 137   POTASSIUM 3.6 3.6 4.0   CHLORIDE 103 104 104   CO2 22 21 24   BUN 19 11 20   CR 1.14* 0.66 0.92   ANIONGAP 10 10 9   LOGAN 8.4* 7.9* 8.9   GLC 58* 78 159*         UPDATE:   CXR AP&L 9/17/21:          ASSESSMENT/PLAN:    (E43) Severe malnutrition (H)  (primary encounter diagnosis)  (N17.9) Acute kidney injury (H)  (R74.8) Elevated liver enzymes  Comment: Chronic severe malnutrition r/t decreased appetite, inability to care for self at home, bedbound since 06/2021. JONAH (creat 1.2), creat baseline 0.6-1.1. Elevated liver enzymes r/t recent UTI vs other.  Plan:   - Discontinue current scheduled and PRN acetaminophen orders  - Add acetaminophen 650mg q6hrs PRN   - Check CMP 9/20 dx elevated liver enzymes, malnutrition  - Add folic acid 400mcg dx folic acid deficiency  - Dietician following  - Discussed addition of IV fluids due to concern for dehydration. Patient verbalized would not want IV fluids.  - Patient verbalizes understanding and agrees with treatment plan.   - Reviewed patient labs w/Dr. Figueroa, recommending to recheck CMP on 9/20. If liver enzymes continue to be trending up, consider abdominal US/hepatitis panel.   UPDATE 9/17/21:  - Reviewed patient status and treatment plan with Kathy (daughter), discussed patient's wishes to not have IV fluids at this time. Discussed due to patient's variable intake, could benefit from IV fluids for hydration. States she plans to visit with mom later today and will call if wanting to add temporary IV fluids   - Discussed options to improve appetite, plan to add remeron 15mg at bedtime   - CXR reviewed, results negative for acute findings    (K59.01) Slow transit  constipation  Comment: Uncontrolled  Plan:   - Give additional senna-s 2 tablets  - Increase senna-s to 1 tablet BID  - Monitor BM    (R53.81) Physical deconditioning  (R41.89) Cognitive impairment  Comment: Chronic cognitive impairment, concern r/t short term memory loss vs dementia. Has not had formal cognitive testing. Ongoing physical deconditioning since 06/2021, has been primarily nonambulatory since then. PTA was living with son. Nonambulatory. Requires assist x 2 with agnes lift. Requires max assist with toileting, dressing.  Plan:   - Encourage active participation in therapy session to increase strength and promote independence in activities and ADLs.   - Cognitive testing to be done in therapy.  - SW following for discharge planning. Goal is to discharge home, likely will need added services upon discharge. Needs 24hr assistance.   - Patient verbalizes understanding and agrees with treatment plan.     (N39.0) UTI (urinary tract infection) with pyuria    Comment: RESOLVED:  UC 9/6 + > 100K Proteus mirabilis, > 100K E coli; both sensitive to Ciprofloxacin.  Plan:   - Completed course of ciprofloxacin on 9/15, asymptomatic and afebrile  - Continue to encourage fluids   - Patient verbalizes understanding and agrees with treatment plan.         Total time spent with patient visit at the skilled nursing facility was 35 mins including patient visit and review of past records. Greater than 50% of total time (21 minutes) spent with counseling patient regarding treatment plan, medication management, follow-up labs, and follow-up appointments. Patient verbalizes understanding and agrees with treatment plan. Coordinating care with Dr. Figueroa regarding patient status and recommendations for treatment plan r/t elevated liver enzymes, CKD, hydration status w/hx severe malnutrition.     Electronically signed by:  PRASHANT Solano CNP     Video-Visit Details  Type of service:  Video Visit  Video End Time (time  video stopped): 1047  Distant Location (provider location):  [facility]

## 2021-09-15 NOTE — PROGRESS NOTES
[facility]  NON-FACE TO FACE PROLONGED SERVICE    Hedy Bailey 1935    Date of Related Face to Face Service: 9/14/21    INTENT OF SERVICE  This is an extended evaluation of patient's specific problem.  The service relates to a recent or future E/M visit.  Documentation supports medical necessity, relates to ongoing patient management and documents start times and stop times.    Regarding the following diagnoses:     UTI (urinary tract infection) with pyuria  Severe malnutrition (H)  Slow transit constipation  Anemia, unspecified type  Elevated liver enzymes  Cognitive impairment  Physical deconditioning  Vitamin D deficiency     * I reviewed records for patient's complicated medical history, including review of recent labs (creat trending up, leukocytosis, anemia, elevated liver enzymes). Also reviewed recent labs from care everywhere for comparison. Reviewed RN progress notes, unclear if patient had bowel movement on 9/14 following bisacodyl supp/senna-s. Medication reconciliation completed. Reviewed therapy notes. Reviewed abdominal XR results. ([id number], [id number]; 1156-9310; 26 minutes)    * I coordinated care with RN regarding patient status today, reports patient is having a better day. States patient ate breakfast and is drinking more fluids today compared to yesterday. States VSS. Also unclear last bowel movement, states patient unable to verbalize last BM either. Reports patient is not c/o pain, no coughing. (Start time 1204, Stop time 1209; 5 minutes)    * I coordinated care with RN regarding patient status on PM shift. Reports , but states patient is incontinent of bladder and unclear if she recently voided. States plans to check later this after to confirm PVR following void. Reports ate 50% of lunch. Also endorses patient taking a nap this afternoon but is easily arousable. VSS.  (Start time 1524, Stop time 1528; 4 minutes)      LABS:   Results for HEDY BAILEY  (MRN [id number]) as of 9/15/2021 12:00   Ref. Range 9/15/2021 05:43   WBC Latest Ref Range: 4.0 - 11.0 10e3/uL 12.0 (H)   Hemoglobin Latest Ref Range: 11.7 - 15.7 g/dL 10.4 (L)   Hematocrit Latest Ref Range: 35.0 - 47.0 % 31.6 (L)   Platelet Count Latest Ref Range: 150 - 450 10e3/uL 430   RBC Count Latest Ref Range: 3.80 - 5.20 10e6/uL 3.24 (L)   MCV Latest Ref Range: 78 - 100 fL 98   MCH Latest Ref Range: 26.5 - 33.0 pg 32.1   MCHC Latest Ref Range: 31.5 - 36.5 g/dL 32.9   RDW Latest Ref Range: 10.0 - 15.0 % 18.2 (H)     Results for INESSA BAILEY (MRN [id number]) as of 9/15/2021 12:00   Ref. Range 9/15/2021 05:43   Sodium Latest Ref Range: 133 - 144 mmol/L 135   Potassium Latest Ref Range: 3.4 - 5.3 mmol/L 3.6   Chloride Latest Ref Range: 94 - 109 mmol/L 103   Carbon Dioxide Latest Ref Range: 20 - 32 mmol/L 22   Urea Nitrogen Latest Ref Range: 7 - 30 mg/dL 19   Creatinine Latest Ref Range: 0.52 - 1.04 mg/dL 1.14 (H)   GFR Estimate Latest Ref Range: >60 mL/min/1.73m2 45 (L)   Calcium Latest Ref Range: 8.5 - 10.1 mg/dL 8.4 (L)   Anion Gap Latest Ref Range: 3 - 14 mmol/L 10     Liver Function Studies - Recent Labs   Lab Test 09/13/21  0540   PROTTOTAL 5.8*   ALBUMIN 1.5*   BILITOTAL 0.6   ALKPHOS 192*   AST 83*   ALT 62*     TSH   Date Value Ref Range Status   09/06/2021 1.81 0.40 - 4.00 mU/L Final       Vitamin D level 2/17/20:  22.1      Abdominal XR 9/15/21:          ASSESSMENT/PLAN     UTI (urinary tract infection) with pyuria  Severe malnutrition (H)  Slow transit constipation  Anemia, unspecified type  Elevated liver enzymes  Cognitive impairment  Physical deconditioning  Vitamin D deficiency    - Check abdominal XR 9/15 dx constipation; unclear last bowel movement. Was given senna-s 2 tabs and bisacodyl suppository yesterday with no reported results. Started on senna-s 1 tablet BID on 9/15.   - Hgb baseline 12-13, today's Hgb 10.4 likely r/t acute UTI. No sx of bleeding reported. Plan to recheck CBC on  9/16.   - Check iron level, iron saturation, ferritin on 9/16  - Check vitamin B12 and folic acid level on 9/16   - Creat elevated from baseline 0.6-1.0, course of ciprofloxacin to be completed today for UTI. RN reports improvement in oral intake/appetite today. Plan to recheck BMP & liver enzymes on 9/16.   - Check vitamin D level 9/16 dx vitamin D deficiency  - Continue to monitor intake, weights  - Plan to follow-up with patient on 9/16/21  - Per therapy notes, patient is dependent on staff with toileting, dressing and bathing. Uses agnes lift for transfers. Max assist with bed mobility.    UPDATE:  - Abdominal XR results reviewed, continue plan of care. Plan to evaluate patient tomorrow to consider ordering CXR.         TIME  Total time spent with Non-Face to Face prolonged service 35 minutes.     Electronically signed by:  PRASHANT Solano CNP

## 2021-09-16 ENCOUNTER — VIRTUAL VISIT (OUTPATIENT)
Dept: GERIATRICS | Facility: CLINIC | Age: 86
End: 2021-09-16
Payer: COMMERCIAL

## 2021-09-16 ENCOUNTER — LAB REQUISITION (OUTPATIENT)
Dept: LAB | Facility: CLINIC | Age: 86
End: 2021-09-16

## 2021-09-16 VITALS
OXYGEN SATURATION: 98 % | BODY MASS INDEX: 18.99 KG/M2 | HEIGHT: 67 IN | WEIGHT: 121 LBS | DIASTOLIC BLOOD PRESSURE: 70 MMHG | RESPIRATION RATE: 18 BRPM | SYSTOLIC BLOOD PRESSURE: 115 MMHG | TEMPERATURE: 97.7 F | HEART RATE: 78 BPM

## 2021-09-16 DIAGNOSIS — R74.8 ELEVATED LIVER ENZYMES: ICD-10-CM

## 2021-09-16 DIAGNOSIS — R53.81 PHYSICAL DECONDITIONING: ICD-10-CM

## 2021-09-16 DIAGNOSIS — K59.01 SLOW TRANSIT CONSTIPATION: ICD-10-CM

## 2021-09-16 DIAGNOSIS — M83.3 ADULT OSTEOMALACIA DUE TO MALNUTRITION: ICD-10-CM

## 2021-09-16 DIAGNOSIS — R41.89 COGNITIVE IMPAIRMENT: ICD-10-CM

## 2021-09-16 DIAGNOSIS — E43 SEVERE MALNUTRITION (H): Primary | ICD-10-CM

## 2021-09-16 DIAGNOSIS — N39.0 UTI (URINARY TRACT INFECTION) WITH PYURIA: ICD-10-CM

## 2021-09-16 DIAGNOSIS — D64.9 ANEMIA, UNSPECIFIED: ICD-10-CM

## 2021-09-16 DIAGNOSIS — E55.9 VITAMIN D DEFICIENCY, UNSPECIFIED: ICD-10-CM

## 2021-09-16 DIAGNOSIS — N17.9 ACUTE KIDNEY INJURY (H): ICD-10-CM

## 2021-09-16 LAB
ALP SERPL-CCNC: 286 U/L (ref 40–150)
ALT SERPL W P-5'-P-CCNC: 103 U/L (ref 0–50)
ANION GAP SERPL CALCULATED.3IONS-SCNC: 11 MMOL/L (ref 3–14)
AST SERPL W P-5'-P-CCNC: 183 U/L (ref 0–45)
BUN SERPL-MCNC: 21 MG/DL (ref 7–30)
CALCIUM SERPL-MCNC: 8.7 MG/DL (ref 8.5–10.1)
CHLORIDE BLD-SCNC: 103 MMOL/L (ref 94–109)
CO2 SERPL-SCNC: 20 MMOL/L (ref 20–32)
CREAT SERPL-MCNC: 1.24 MG/DL (ref 0.52–1.04)
ERYTHROCYTE [DISTWIDTH] IN BLOOD BY AUTOMATED COUNT: 18.1 % (ref 10–15)
FERRITIN SERPL-MCNC: 1434 NG/ML (ref 8–252)
FOLATE SERPL-MCNC: 3 NG/ML
GFR SERPL CREATININE-BSD FRML MDRD: 40 ML/MIN/1.73M2
GLUCOSE BLD-MCNC: 63 MG/DL (ref 70–99)
HCT VFR BLD AUTO: 35.2 % (ref 35–47)
HGB BLD-MCNC: 11.5 G/DL (ref 11.7–15.7)
IRON SATN MFR SERPL: 16 % (ref 15–46)
IRON SERPL-MCNC: 27 UG/DL (ref 35–180)
MCH RBC QN AUTO: 31.6 PG (ref 26.5–33)
MCHC RBC AUTO-ENTMCNC: 32.7 G/DL (ref 31.5–36.5)
MCV RBC AUTO: 97 FL (ref 78–100)
PLATELET # BLD AUTO: 498 10E3/UL (ref 150–450)
POTASSIUM BLD-SCNC: 3.4 MMOL/L (ref 3.4–5.3)
RBC # BLD AUTO: 3.64 10E6/UL (ref 3.8–5.2)
SODIUM SERPL-SCNC: 134 MMOL/L (ref 133–144)
TIBC SERPL-MCNC: 170 UG/DL (ref 240–430)
VIT B12 SERPL-MCNC: 1121 PG/ML (ref 193–986)
WBC # BLD AUTO: 11.2 10E3/UL (ref 4–11)

## 2021-09-16 PROCEDURE — 84460 ALANINE AMINO (ALT) (SGPT): CPT | Performed by: NURSE PRACTITIONER

## 2021-09-16 PROCEDURE — 82306 VITAMIN D 25 HYDROXY: CPT | Performed by: NURSE PRACTITIONER

## 2021-09-16 PROCEDURE — 82607 VITAMIN B-12: CPT | Performed by: NURSE PRACTITIONER

## 2021-09-16 PROCEDURE — P9603 ONE-WAY ALLOW PRORATED MILES: HCPCS | Performed by: NURSE PRACTITIONER

## 2021-09-16 PROCEDURE — 82728 ASSAY OF FERRITIN: CPT | Performed by: NURSE PRACTITIONER

## 2021-09-16 PROCEDURE — 99310 SBSQ NF CARE HIGH MDM 45: CPT | Performed by: NURSE PRACTITIONER

## 2021-09-16 PROCEDURE — 36415 COLL VENOUS BLD VENIPUNCTURE: CPT | Performed by: NURSE PRACTITIONER

## 2021-09-16 PROCEDURE — 84075 ASSAY ALKALINE PHOSPHATASE: CPT | Performed by: NURSE PRACTITIONER

## 2021-09-16 PROCEDURE — 80048 BASIC METABOLIC PNL TOTAL CA: CPT | Performed by: NURSE PRACTITIONER

## 2021-09-16 PROCEDURE — 84450 TRANSFERASE (AST) (SGOT): CPT | Performed by: NURSE PRACTITIONER

## 2021-09-16 PROCEDURE — 83550 IRON BINDING TEST: CPT | Performed by: NURSE PRACTITIONER

## 2021-09-16 PROCEDURE — 85027 COMPLETE CBC AUTOMATED: CPT | Performed by: NURSE PRACTITIONER

## 2021-09-16 PROCEDURE — 82746 ASSAY OF FOLIC ACID SERUM: CPT | Performed by: NURSE PRACTITIONER

## 2021-09-16 RX ORDER — ACETAMINOPHEN 325 MG/1
650 TABLET ORAL EVERY 6 HOURS PRN
COMMUNITY

## 2021-09-17 ENCOUNTER — LAB REQUISITION (OUTPATIENT)
Dept: LAB | Facility: CLINIC | Age: 86
End: 2021-09-17

## 2021-09-17 ENCOUNTER — DOCUMENTATION ONLY (OUTPATIENT)
Dept: GERIATRICS | Facility: CLINIC | Age: 86
End: 2021-09-17

## 2021-09-17 DIAGNOSIS — Z11.52 ENCOUNTER FOR SCREENING FOR COVID-19: ICD-10-CM

## 2021-09-17 PROCEDURE — U0005 INFEC AGEN DETEC AMPLI PROBE: HCPCS | Performed by: NURSE PRACTITIONER

## 2021-09-17 RX ORDER — LANOLIN ALCOHOL/MO/W.PET/CERES
400 CREAM (GRAM) TOPICAL DAILY
COMMUNITY
Start: 2021-09-18

## 2021-09-17 RX ORDER — MIRTAZAPINE 15 MG/1
15 TABLET, FILM COATED ORAL AT BEDTIME
COMMUNITY
End: 2021-09-22

## 2021-09-17 NOTE — PROGRESS NOTES
Kathy (daughter) called, reports discussed with patient goals of care. Confirms goals are to get stronger and go home, would like to start temporary IV fluids due to decreased intake.   Also reviewed CXR results.       Plan:   - RN to place PIV  - Start NS at 100cc x 1L, then cap PIV  - Check STAT BMP on 9/19/21 dx JONAH, malnutrition        Khushbu Bailey, APRN CNP on 9/17/2021 at 4:41 PM

## 2021-09-18 ENCOUNTER — LAB REQUISITION (OUTPATIENT)
Dept: LAB | Facility: CLINIC | Age: 86
End: 2021-09-18

## 2021-09-18 ENCOUNTER — TELEPHONE (OUTPATIENT)
Dept: GERIATRICS | Facility: CLINIC | Age: 86
End: 2021-09-18

## 2021-09-18 DIAGNOSIS — E43 UNSPECIFIED SEVERE PROTEIN-CALORIE MALNUTRITION (H): ICD-10-CM

## 2021-09-18 LAB
DEPRECATED CALCIDIOL+CALCIFEROL SERPL-MC: 24 UG/L (ref 20–75)
SARS-COV-2 RNA RESP QL NAA+PROBE: NEGATIVE

## 2021-09-18 NOTE — TELEPHONE ENCOUNTER
[facility] TRIAGE ENCOUNTER    Chief Complaint   Patient presents with     Altered Mental Status       Hedy English is a 85 year old  (1935), Nurse called today to report: Patient was on IV fluids and received 1L NS that ended yesterday evening for dehydration. Concern for today is that over the last couple hours has had a change in mental status. This morning she was answering simple questions and now is not able to answer questions. She is not eating or drinking today. Her RR is around 30 lungs sounds are clear, but no fevers, and all other vitals are stable.last BM was 5 days ago. Abdomen is soft, non tender, not distended.     ASSESSMENT/PLAN    Vitals are stable, reasonable to watch her and see how she does. If her condition worsens would need to be seen in the ED    UA/UC     Abdominal ultrasound complete    CMP CBC with diff tomorrow    Vitals every 4 hours for x3 days    Biascodyl supp today once    Electronically signed by:   Domonique Macias NP

## 2021-09-19 ENCOUNTER — LAB REQUISITION (OUTPATIENT)
Dept: LAB | Facility: CLINIC | Age: 86
End: 2021-09-19

## 2021-09-19 ENCOUNTER — TELEPHONE (OUTPATIENT)
Dept: GERIATRICS | Facility: CLINIC | Age: 86
End: 2021-09-19

## 2021-09-19 DIAGNOSIS — E46 UNSPECIFIED PROTEIN-CALORIE MALNUTRITION (H): ICD-10-CM

## 2021-09-19 LAB
ALBUMIN SERPL-MCNC: 1.5 G/DL (ref 3.4–5)
ALP SERPL-CCNC: 211 U/L (ref 40–150)
ALT SERPL W P-5'-P-CCNC: 41 U/L (ref 0–50)
ANION GAP SERPL CALCULATED.3IONS-SCNC: 12 MMOL/L (ref 3–14)
AST SERPL W P-5'-P-CCNC: 49 U/L (ref 0–45)
BILIRUB SERPL-MCNC: 0.6 MG/DL (ref 0.2–1.3)
BUN SERPL-MCNC: 27 MG/DL (ref 7–30)
CALCIUM SERPL-MCNC: 8.3 MG/DL (ref 8.5–10.1)
CHLORIDE BLD-SCNC: 107 MMOL/L (ref 94–109)
CO2 SERPL-SCNC: 16 MMOL/L (ref 20–32)
CREAT SERPL-MCNC: 1.33 MG/DL (ref 0.52–1.04)
ERYTHROCYTE [DISTWIDTH] IN BLOOD BY AUTOMATED COUNT: 18.3 % (ref 10–15)
GFR SERPL CREATININE-BSD FRML MDRD: 37 ML/MIN/1.73M2
GLUCOSE BLD-MCNC: 71 MG/DL (ref 70–99)
HCT VFR BLD AUTO: 30.8 % (ref 35–47)
HGB BLD-MCNC: 10 G/DL (ref 11.7–15.7)
MCH RBC QN AUTO: 31.5 PG (ref 26.5–33)
MCHC RBC AUTO-ENTMCNC: 32.5 G/DL (ref 31.5–36.5)
MCV RBC AUTO: 97 FL (ref 78–100)
PLATELET # BLD AUTO: 536 10E3/UL (ref 150–450)
POTASSIUM BLD-SCNC: 4.1 MMOL/L (ref 3.4–5.3)
PROT SERPL-MCNC: 5.9 G/DL (ref 6.8–8.8)
RBC # BLD AUTO: 3.17 10E6/UL (ref 3.8–5.2)
SODIUM SERPL-SCNC: 135 MMOL/L (ref 133–144)
WBC # BLD AUTO: 13.2 10E3/UL (ref 4–11)

## 2021-09-19 PROCEDURE — 85027 COMPLETE CBC AUTOMATED: CPT | Performed by: NURSE PRACTITIONER

## 2021-09-19 PROCEDURE — 80053 COMPREHEN METABOLIC PANEL: CPT | Performed by: NURSE PRACTITIONER

## 2021-09-19 NOTE — TELEPHONE ENCOUNTER
[facility] TRIAGE ENCOUNTER    Chief Complaint   Patient presents with     Results       Hedy English is a 85 year old  (1935), Nurse called today to report: Lab results, ultrasound results. Patient is also improving today from yesterday. She had a BM yesterday, she is eating and drinking more. Urine was not yet obtained. Had a bump in WBCs and creat but she is drinking more today.    ASSESSMENT/PLAN    Since she is improving, reasonable to watch and see how she does.     Will need follow up on ultrasound results.    Await UA/UC    Electronically signed by:   Domonique Macias, NP

## 2021-09-20 ENCOUNTER — LAB REQUISITION (OUTPATIENT)
Dept: LAB | Facility: CLINIC | Age: 86
End: 2021-09-20

## 2021-09-20 ENCOUNTER — VIRTUAL VISIT (OUTPATIENT)
Dept: GERIATRICS | Facility: CLINIC | Age: 86
End: 2021-09-20
Payer: COMMERCIAL

## 2021-09-20 VITALS
TEMPERATURE: 97.2 F | BODY MASS INDEX: 18.99 KG/M2 | RESPIRATION RATE: 16 BRPM | OXYGEN SATURATION: 100 % | HEIGHT: 67 IN | HEART RATE: 88 BPM | SYSTOLIC BLOOD PRESSURE: 145 MMHG | WEIGHT: 121 LBS | DIASTOLIC BLOOD PRESSURE: 78 MMHG

## 2021-09-20 DIAGNOSIS — N17.9 ACUTE KIDNEY INJURY (H): ICD-10-CM

## 2021-09-20 DIAGNOSIS — R53.81 PHYSICAL DECONDITIONING: ICD-10-CM

## 2021-09-20 DIAGNOSIS — R41.89 COGNITIVE IMPAIRMENT: ICD-10-CM

## 2021-09-20 DIAGNOSIS — E43 SEVERE MALNUTRITION (H): Primary | ICD-10-CM

## 2021-09-20 DIAGNOSIS — R30.0 DYSURIA: ICD-10-CM

## 2021-09-20 DIAGNOSIS — K59.01 SLOW TRANSIT CONSTIPATION: ICD-10-CM

## 2021-09-20 DIAGNOSIS — R74.8 ELEVATED LIVER ENZYMES: ICD-10-CM

## 2021-09-20 PROBLEM — I10 HYPERTENSION: Status: ACTIVE | Noted: 2021-09-20

## 2021-09-20 PROBLEM — F41.1 GAD (GENERALIZED ANXIETY DISORDER): Status: ACTIVE | Noted: 2020-09-23

## 2021-09-20 LAB
ALBUMIN UR-MCNC: 20 MG/DL
ANION GAP SERPL CALCULATED.3IONS-SCNC: 11 MMOL/L (ref 3–14)
APPEARANCE UR: CLEAR
BILIRUB UR QL STRIP: NEGATIVE
BUN SERPL-MCNC: 25 MG/DL (ref 7–30)
CALCIUM SERPL-MCNC: 8.1 MG/DL (ref 8.5–10.1)
CHLORIDE BLD-SCNC: 106 MMOL/L (ref 94–109)
CO2 SERPL-SCNC: 20 MMOL/L (ref 20–32)
COLOR UR AUTO: YELLOW
CREAT SERPL-MCNC: 1.17 MG/DL (ref 0.52–1.04)
GFR SERPL CREATININE-BSD FRML MDRD: 43 ML/MIN/1.73M2
GLUCOSE BLD-MCNC: 68 MG/DL (ref 70–99)
GLUCOSE UR STRIP-MCNC: NEGATIVE MG/DL
HGB UR QL STRIP: NEGATIVE
KETONES UR STRIP-MCNC: NEGATIVE MG/DL
LEUKOCYTE ESTERASE UR QL STRIP: NEGATIVE
MUCOUS THREADS #/AREA URNS LPF: PRESENT /LPF
NITRATE UR QL: NEGATIVE
PH UR STRIP: 5 [PH] (ref 5–7)
POTASSIUM BLD-SCNC: 3.4 MMOL/L (ref 3.4–5.3)
RBC URINE: 1 /HPF
SODIUM SERPL-SCNC: 137 MMOL/L (ref 133–144)
SP GR UR STRIP: 1.01 (ref 1–1.03)
SQUAMOUS EPITHELIAL: 2 /HPF
UROBILINOGEN UR STRIP-MCNC: NORMAL MG/DL
WBC URINE: 2 /HPF

## 2021-09-20 PROCEDURE — 87106 FUNGI IDENTIFICATION YEAST: CPT | Performed by: NURSE PRACTITIONER

## 2021-09-20 PROCEDURE — 81001 URINALYSIS AUTO W/SCOPE: CPT | Performed by: NURSE PRACTITIONER

## 2021-09-20 PROCEDURE — 36415 COLL VENOUS BLD VENIPUNCTURE: CPT | Performed by: NURSE PRACTITIONER

## 2021-09-20 PROCEDURE — 80048 BASIC METABOLIC PNL TOTAL CA: CPT | Performed by: NURSE PRACTITIONER

## 2021-09-20 PROCEDURE — 87086 URINE CULTURE/COLONY COUNT: CPT | Performed by: NURSE PRACTITIONER

## 2021-09-20 PROCEDURE — 99310 SBSQ NF CARE HIGH MDM 45: CPT | Performed by: NURSE PRACTITIONER

## 2021-09-20 RX ORDER — BISACODYL 10 MG
10 SUPPOSITORY, RECTAL RECTAL DAILY PRN
COMMUNITY

## 2021-09-20 RX ORDER — ONDANSETRON 4 MG/1
4 TABLET, FILM COATED ORAL EVERY 6 HOURS PRN
COMMUNITY

## 2021-09-20 ASSESSMENT — MIFFLIN-ST. JEOR: SCORE: 1036.48

## 2021-09-20 NOTE — PROGRESS NOTES
"[facility]     Hedy Engilsh is being evaluated via a billable video visit.   The patient has been notified of following:  \"This video visit will be conducted via a call between you and your provider. We have found that certain health care needs can be provided without the need for an in-person physical exam.  This service lets us provide the care you need with a video conversation. If during the course of the call the provider feels a video visit is not appropriate, you will not be charged for this service.\"   The provider has received verbal consent for a Video Visit from the patient or first contact? Yes  Patient  or facility staff would like the video invitation sent by: N/A   Video Start Time: 0824    Received verbal consent to use Care Everywhere in order to access labs, documents, histories, and all other needed information to provide care at current facility.    [facility] Medical Record Number:  [id number]  Place of Location at the time of visit: [facility]   Chief Complaint   Patient presents with     Video Visit     RECHECK     Results     HPI:  Hedy English  is a 85 year old (1935), who is being seen today for a visit.  HPI information obtained from: facility chart records, facility staff, patient report, [facility] chart review and Care Everywhere [facility] chart review.     PMH: cognitive impairment, anxiety, malnutrition     Patient admitted to [facility] 9/6-9/8/21 due to weakness, decreased intake. UC + > 100K Proteus mirabilis and > 100K E coli. Was treated with rocephin and transitioned to ciprofloxacin upon discharge. Noted to be nonambulatory since 06/2021 with unclear etiology. Also noted to have short-term memory loss/questionable dementia with severe malnutrition.      Transferred to [facility] on 9/8/21.      Today's concern is:  Patient seen today to follow-up on patient status and to review abdominal US results from 9/19. RN reports patient's " daughter requesting phone call to review results and treatment plan. Noted to have acute altered mental status, N/V, constipation on 9/18; RN reports status improved on 9/19.     During exam, patient seen resting in bed. Reports feeling better today, received IV fluids on Friday and did report having more energy. Endorses not feeling well on Saturday (nausea). Per nursing staff, patient had altered mental status on Saturday, co gnition improved on Sunday. Patient does endorse variable intake, states if she eats too much she does get nauseous. Usually eats approx 25% of meals. Unable to recall last bowel movement; RN staff states LBM was on 9/18. Has been participating in therapy, feels like she is getting stronger. Attempts to get up in chair w/meals. SW following for discharge planning.       Past Medical and Surgical History reviewed in Epic today.  MEDICATIONS:    Current Outpatient Medications   Medication Sig Dispense Refill     acetaminophen (TYLENOL) 325 MG tablet Take 650 mg by mouth every 6 hours as needed for mild pain       bisacodyl (DULCOLAX) 10 MG suppository Place 10 mg rectally daily as needed for constipation       diclofenac (VOLTAREN) 1 % topical gel Apply topically 4 times daily       dorzolamide-timolol (COSOPT) 2-0.5 % ophthalmic solution PLACE 1 DROP INTO BOTH EYES TWICE DAILY       folic acid (FOLVITE) 400 MCG tablet Take 400 mcg by mouth daily       mirtazapine (REMERON) 15 MG tablet Take 15 mg by mouth At Bedtime       multivitamin w/minerals (THERA-VIT-M) tablet Take 1 tablet by mouth daily       ondansetron (ZOFRAN) 4 MG tablet Take 4 mg by mouth every 6 hours as needed for nausea       PARoxetine (PAXIL) 20 MG tablet Take 20 mg by mouth daily       SENNA-docusate sodium (SENNA S) 8.6-50 MG tablet Take 1 tablet by mouth 2 times daily And 1 tablet BID PRN         REVIEW OF SYSTEMS: 4 point ROS including Respiratory, CV, GI and , other than that noted in the HPI,  is  "negative      Objective: BP (!) 145/78   Pulse 88   Temp 97.2  F (36.2  C)   Resp 16   Ht 1.702 m (5' 7\")   Wt 54.9 kg (121 lb)   SpO2 100%   BMI 18.95 kg/m    Limited visit exam done given COVID-19 precautions.   GENERAL APPEARANCE:  Alert, in no distress, oriented, thin, cooperative  ENT:  Mouth and posterior oropharynx normal, moist mucous membranes, [place]  EYES:  EOM, conjunctivae, lids, pupils and irises normal, PERRL  RESP:  no respiratory distress  CV:  Unable to assess.  M/S:   Gait and station abnormal, resting in bed.   SKIN:  Inspection of skin and subcutaneous tissue baseline  NEURO:   Cranial nerves 2-12 are normal tested and grossly at patient's baseline  PSYCH:  oriented X 3, memory impaired , affect and mood normal    Wt Readings from Last 4 Encounters:   09/20/21 54.9 kg (121 lb)   09/10/21 54.9 kg (121 lb)   09/10/21 54.9 kg (121 lb)   09/09/21 49.4 kg (109 lb)       Labs:   Labs done in SNF are in [facility]. Please refer to them using [facility]/Care Everywhere., Recent labs in EPIC reviewed by me today.  and   Most Recent 3 CBC's:  Recent Labs   Lab Test 09/19/21  0600 09/16/21  0950 09/15/21  0543   WBC 13.2* 11.2* 12.0*   HGB 10.0* 11.5* 10.4*   MCV 97 97 98   * 498* 430     Most Recent 3 BMP's:  Recent Labs   Lab Test 09/20/21  0600 09/19/21  0600 09/16/21  0950    135 134   POTASSIUM 3.4 4.1 3.4   CHLORIDE 106 107 103   CO2 20 16* 20   BUN 25 27 21   CR 1.17* 1.33* 1.24*   ANIONGAP 11 12 11   LOGAN 8.1* 8.3* 8.7   GLC 68* 71 63*       Lab Results   Component Value Date    ALBUMIN 1.5 09/19/2021    ALBUMIN 4.2 11/23/2007         Abdominal US 9/19/21:                 ASSESSMENT/PLAN:    (E43) Severe malnutrition (H)  (primary encounter diagnosis)  (N17.9) Acute kidney injury (H)  (R74.8) Elevated liver enzymes  (K59.01) Slow transit constipation  Comment: Chronic severe malnutrition r/t decreased appetite, inability to care for self at home, bedbound since 06/2021 (unclear " etiology). Noted to have approx 50lb weight loss over the past year. Body mass index is 18.95 kg/m .  Received IV fluids on 9/17, JONAH resolved. Elevated liver enzymes improving. Uncontrolled constipation.  Abdominal ultrasound 9/19 + trace ascites r/t malnutrition vs other.   Plan:   - Add bisacodyl suppository 10mg every day PRN dx constipation  - Give bisacodyl suppository PRN every 3 days if no BM in the last 3 days dx constipation  - Add zofran 4mg q6hrs PRN dx nausea  - Continue senna-s BID and PRN, multivitamin  - Continue folic acid, recently added on 9/17 due to folic acid deficiency  - Continue remeron 15mg at bedtime, added on 9/18  - Start calorie counts every day x 5 days  - Check BMP 9/22 dx JONAH, malnutrition  - Check alkaline phosphatase, ALT, AST 9/22 dx elevated liver enzymes  - PT to eval/treat edema  - Reviewed patient status, abdominal US, labs w/Dr. Figueroa. Recommending to consider CT abd/pelvis due to failure to thrive w/malnutrition, weight loss.   - Discussed patient status with Kathy (daughter) and due to chronic malnutrition w/weight loss, nausea, would recommend CT abd/pelvis as next step. Kathy verbalizes understanding, plans to review w/family if that is something they want to pursue. Denies verbalizes understanding and agrees with treatment plan.    (R53.81) Physical deconditioning  (R41.89) Cognitive impairment  Comment: Chronic cognitive impairment, concern r/t short term memory loss vs dementia. Has not had formal cognitive testing. Per chart review, noted to have fatigue, decreased energy, decreased appetite and weakness since 03/2021. Chronic physical deconditioning, has been primarily nonambulatory since 06/2021. Unclear etiology of physical and cognitive decline, possible progression of dementia vs malignancy.  PTA was living with son in mobile home. Requires assist x 2 with agnes lift. Requires max assist with toileting, dressing.  Plan:   - Encourage active participation in  therapy session to increase strength and promote independence in activities and ADLs.   - Cognitive testing to be done in therapy. ST following for cognition.  - SW following for discharge planning. Goal is to discharge home, likely will need added services upon discharge to provide 24hr care. May need to consider LTC.   - Patient and Kathy verbalizes understanding and agrees with treatment plan.   - Noted to have Neurology referral from PCP appt in 05/2021 d/t cognitive impairment, unclear if patient scheduled appointment. Plan to follow-up with Kathy regarding referral.         Total time spent with patient visit at the skilled nursing facility was 40 mins including patient visit and review of past records. Greater than 50% of total time (28 minutes) spent with counseling patient and daughter (Kathy) regarding treatment plan, medication management, follow-up labs, and follow-up appointments. Kathy and patient verbalizes understanding and agrees with treatment plan. Coordinating care with Dr. Figueroa regarding patient status, recent imaging results/labs, and recommendations for treatment plan. Coordinating care with SW regarding discharge planning.     Electronically signed by:  PRASHANT Solano CNP     Video-Visit Details  Type of service:  Video Visit  Video End Time (time video stopped): 0830  Distant Location (provider location):  [facility]

## 2021-09-22 ENCOUNTER — LAB REQUISITION (OUTPATIENT)
Dept: LAB | Facility: CLINIC | Age: 86
End: 2021-09-22

## 2021-09-22 ENCOUNTER — DOCUMENTATION ONLY (OUTPATIENT)
Dept: GERIATRICS | Facility: CLINIC | Age: 86
End: 2021-09-22

## 2021-09-22 DIAGNOSIS — R94.5 ABNORMAL RESULTS OF LIVER FUNCTION STUDIES: ICD-10-CM

## 2021-09-22 DIAGNOSIS — E44.1 MILD PROTEIN-CALORIE MALNUTRITION (H): ICD-10-CM

## 2021-09-22 NOTE — PROGRESS NOTES
Kathy (daughter) called to report new concerns, states last night patient was having increased confusion w/delusions during visit at 4pm. Also states patient called her around 8:30pm and was crying r/t ongoing delusions, was able to calm her down. Kathy reports this is unlike her mom and concerned new symptoms r/t starting remeron. States over the past few days, patient has been participating in therapy and noted improvement in appetite.       Plan:   - Discontinue remeron, was started on 9/17 for malnutrition  - Reviewed recent labs  - Continue to monitor intake, weights          PRASHANT Solano CNP on 9/22/2021 at 9:09 AM

## 2021-09-23 ENCOUNTER — DOCUMENTATION ONLY (OUTPATIENT)
Dept: GERIATRICS | Facility: CLINIC | Age: 86
End: 2021-09-23

## 2021-09-23 LAB
ALP SERPL-CCNC: 188 U/L (ref 40–150)
ALT SERPL W P-5'-P-CCNC: 34 U/L (ref 0–50)
ANION GAP SERPL CALCULATED.3IONS-SCNC: 10 MMOL/L (ref 3–14)
AST SERPL W P-5'-P-CCNC: 40 U/L (ref 0–45)
BACTERIA UR CULT: ABNORMAL
BUN SERPL-MCNC: 19 MG/DL (ref 7–30)
CALCIUM SERPL-MCNC: 7.7 MG/DL (ref 8.5–10.1)
CHLORIDE BLD-SCNC: 106 MMOL/L (ref 94–109)
CO2 SERPL-SCNC: 22 MMOL/L (ref 20–32)
CREAT SERPL-MCNC: 1.01 MG/DL (ref 0.52–1.04)
GFR SERPL CREATININE-BSD FRML MDRD: 52 ML/MIN/1.73M2
GLUCOSE BLD-MCNC: 60 MG/DL (ref 70–99)
POTASSIUM BLD-SCNC: 3.3 MMOL/L (ref 3.4–5.3)
SODIUM SERPL-SCNC: 138 MMOL/L (ref 133–144)

## 2021-09-23 PROCEDURE — 84460 ALANINE AMINO (ALT) (SGPT): CPT | Performed by: NURSE PRACTITIONER

## 2021-09-23 PROCEDURE — 36415 COLL VENOUS BLD VENIPUNCTURE: CPT | Performed by: NURSE PRACTITIONER

## 2021-09-23 PROCEDURE — 84075 ASSAY ALKALINE PHOSPHATASE: CPT | Performed by: NURSE PRACTITIONER

## 2021-09-23 PROCEDURE — P9603 ONE-WAY ALLOW PRORATED MILES: HCPCS | Performed by: NURSE PRACTITIONER

## 2021-09-23 PROCEDURE — 84450 TRANSFERASE (AST) (SGOT): CPT | Performed by: NURSE PRACTITIONER

## 2021-09-23 PROCEDURE — 80048 BASIC METABOLIC PNL TOTAL CA: CPT | Performed by: NURSE PRACTITIONER

## 2021-09-23 NOTE — PROGRESS NOTES
Reviewed today's labs. Noted K+ 3.3, as well as creat and liver enzymes trending down.     Corrected calcium (albumin 1.5): approx 10.02        Plan:   - Give potassium chloride 20meq today dx hypokalemia  - Recheck [facility] 9/28 dx malnutrition, hypokalemia          PRASHANT Solano CNP on 9/23/2021 at 1:21 PM

## 2021-09-26 ASSESSMENT — MIFFLIN-ST. JEOR: SCORE: 1039.2

## 2021-09-27 ENCOUNTER — TRANSITIONAL CARE UNIT VISIT (OUTPATIENT)
Dept: GERIATRICS | Facility: CLINIC | Age: 86
End: 2021-09-27
Payer: COMMERCIAL

## 2021-09-27 ENCOUNTER — LAB REQUISITION (OUTPATIENT)
Dept: LAB | Facility: CLINIC | Age: 86
End: 2021-09-27

## 2021-09-27 VITALS
TEMPERATURE: 97.3 F | HEIGHT: 67 IN | HEART RATE: 90 BPM | RESPIRATION RATE: 18 BRPM | DIASTOLIC BLOOD PRESSURE: 83 MMHG | BODY MASS INDEX: 19.09 KG/M2 | SYSTOLIC BLOOD PRESSURE: 121 MMHG | WEIGHT: 121.6 LBS | OXYGEN SATURATION: 94 %

## 2021-09-27 DIAGNOSIS — R53.81 PHYSICAL DECONDITIONING: ICD-10-CM

## 2021-09-27 DIAGNOSIS — E87.6 HYPOKALEMIA: ICD-10-CM

## 2021-09-27 DIAGNOSIS — R41.89 COGNITIVE IMPAIRMENT: ICD-10-CM

## 2021-09-27 DIAGNOSIS — E43 SEVERE MALNUTRITION (H): Primary | ICD-10-CM

## 2021-09-27 PROCEDURE — 99310 SBSQ NF CARE HIGH MDM 45: CPT | Performed by: NURSE PRACTITIONER

## 2021-09-27 NOTE — PROGRESS NOTES
[facility]  [facility] Medical Record Number:  [id number]  Place of Service where encounter took place:  [facility] ([facility]) [[id number]]  Chief Complaint   Patient presents with     Nursing Home Acute       HPI:    Hedy English  is a 85 year old (1935), who is being seen today for an episodic care visit.  HPI information obtained from: facility chart records, facility staff and patient report. Today's concern is:    Patient Hedy English is a 85 year old (1935) female admitted to [facility] for rehabilitation s/p hospitalization [facility] 9/6-9/8/21 for due to weakness, decreased intake and UC + > 100K Proteus mirabilis and > 100K E coli and completed course of rocephin and ciprofloxacin (resolved). Noted to be nonambulatory since 06/2021 with unclear etiology. Family attribute to immobility.. Also noted to have short-term memory loss/questionable dementia with severe malnutrition.     PMH: cognitive impairment, anxiety, malnutrition       Severe malnutrition (H)  Physical deconditioning  Cognitive impairment     Limited progress in therapies. Patient significantly deconditioned due to prolong immobility   Discussed today getting patient Broda chair so patient gets up for meals due to limited strength in core muscles   SLUM 14/30, patient is DNR/DNI  Had been living in house with son  Has fatigue with sitting and some anxiety   Variable intake at meals  Regular bowel movements noted      Past Medical and Surgical History reviewed in Epic today.    MEDICATIONS:    Current Outpatient Medications   Medication Sig Dispense Refill     acetaminophen (TYLENOL) 325 MG tablet Take 650 mg by mouth every 6 hours as needed for mild pain       bisacodyl (DULCOLAX) 10 MG suppository Place 10 mg rectally daily as needed for constipation       diclofenac (VOLTAREN) 1 % topical gel Apply topically 4 times daily       dorzolamide-timolol (COSOPT) 2-0.5 %  "ophthalmic solution PLACE 1 DROP INTO BOTH EYES TWICE DAILY       folic acid (FOLVITE) 400 MCG tablet Take 400 mcg by mouth daily       multivitamin w/minerals (THERA-VIT-M) tablet Take 1 tablet by mouth daily       ondansetron (ZOFRAN) 4 MG tablet Take 4 mg by mouth every 6 hours as needed for nausea       PARoxetine (PAXIL) 20 MG tablet Take 20 mg by mouth daily       SENNA-docusate sodium (SENNA S) 8.6-50 MG tablet Take 1 tablet by mouth 2 times daily And 1 tablet BID PRN           REVIEW OF SYSTEMS:  4 point ROS including Respiratory, CV, GI and , other than that noted in the HPI,  is negative    Objective:  /83   Pulse 90   Temp 97.3  F (36.3  C)   Resp 18   Ht 1.702 m (5' 7\")   Wt 55.2 kg (121 lb 9.6 oz)   SpO2 94%   BMI 19.05 kg/m     Wt Readings from Last 2 Encounters:   09/26/21 55.2 kg (121 lb 9.6 oz)   09/20/21 54.9 kg (121 lb)       Exam:  GENERAL APPEARANCE:  Alert, in no distress  ENT:  Mouth and posterior oropharynx normal, moist mucous membranes  EYES:  EOM, conjunctivae, lids, pupils and irises normal, has glasses on  NECK:  No adenopathy,masses or thyromegaly  RESP:  respiratory effort and palpation of chest normal, lungs clear to auscultation , no respiratory distress  CV:  Palpation and auscultation of heart done , regular rate and rhythm, no murmur, rub, or gallop  ABDOMEN:  normal bowel sounds, soft, nontender, no hepatosplenomegaly or other masses  M/S:   lying in bed, unable to lift legs in bed, unable to pull toes back towards her  SKIN:  Inspection of skin and subcutaneous tissue baseline, mild edema in legs bilateral  NEURO:   Cranial nerves 2-12 are normal tested and grossly at patient's baseline  PSYCH:  affect and mood normal    Labs:   Labs done in SNF are in [facility] EPIC. Please refer to them using EPIC/Care Everywhere.    ASSESSMENT/PLAN:     Severe malnutrition (H)  Physical deconditioning  Cognitive impairment    Severe malnutrition (H)  Chronic severe malnutrition " r/t decreased appetite, inability to care for self at home, bedbound since 06/2021.  Body mass index is 17.07 kg/m .  Weight stable ~121lb at TCU  Continue dietary supplements  Monitor intakes  If continue to not thrive and further weight loss consider comfort cares or hospice referral    Generalized anxiety disorder  Anxious at times  Continue Paxil, provide emotional support  Consider referral house psychologist    Cognitive impairment  Physical deconditioning  short term memory loss vs dementia. Ongoing physical deconditioning since 06/2021, has been primarily nonambulatory since then.  Continue APM due to immobility   ST to eval/treat cognition   Coordinated with  and patient liives with son that has cognitive impairment. Goal is to return home, however, patient would need additional resources (care giver training, assistance with meals, activities of daily living,and medication administration) if to return home or long term care.    Coordinated with therapies on patient progress. Very deconditioned with difficulty sitting up.  Plan is to trial Broda chair to get up for meals  Coordinated with nursing and patient not getting up for meals and no acute concerns with eating /swallowing    Advanced care planning   Patient is DNR/DNI     Total time spent with patient visit was 36 min including patient visit and review of past records. Greater than 50% of total time spent with counseling and coordinating care with OT, nursing, and  as noted above.     Electronically signed by:  PRASHANT Leonard CNP

## 2021-09-28 ENCOUNTER — NURSING HOME VISIT (OUTPATIENT)
Dept: GERIATRICS | Facility: CLINIC | Age: 86
End: 2021-09-28
Payer: COMMERCIAL

## 2021-09-28 ENCOUNTER — TELEPHONE (OUTPATIENT)
Dept: GERIATRICS | Facility: CLINIC | Age: 86
End: 2021-09-28

## 2021-09-28 DIAGNOSIS — E43 SEVERE MALNUTRITION (H): Primary | ICD-10-CM

## 2021-09-28 DIAGNOSIS — R41.89 COGNITIVE IMPAIRMENT: ICD-10-CM

## 2021-09-28 DIAGNOSIS — R74.8 ELEVATED LIVER ENZYMES: ICD-10-CM

## 2021-09-28 LAB
ANION GAP SERPL CALCULATED.3IONS-SCNC: 6 MMOL/L (ref 3–14)
BUN SERPL-MCNC: 17 MG/DL (ref 7–30)
CALCIUM SERPL-MCNC: 7.5 MG/DL (ref 8.5–10.1)
CHLORIDE BLD-SCNC: 106 MMOL/L (ref 94–109)
CO2 SERPL-SCNC: 24 MMOL/L (ref 20–32)
CREAT SERPL-MCNC: 0.84 MG/DL (ref 0.52–1.04)
GFR SERPL CREATININE-BSD FRML MDRD: 64 ML/MIN/1.73M2
GLUCOSE BLD-MCNC: 72 MG/DL (ref 70–99)
POTASSIUM BLD-SCNC: 3.1 MMOL/L (ref 3.4–5.3)
SODIUM SERPL-SCNC: 136 MMOL/L (ref 133–144)

## 2021-09-28 PROCEDURE — 36415 COLL VENOUS BLD VENIPUNCTURE: CPT | Performed by: NURSE PRACTITIONER

## 2021-09-28 PROCEDURE — 80048 BASIC METABOLIC PNL TOTAL CA: CPT | Performed by: NURSE PRACTITIONER

## 2021-09-28 PROCEDURE — 99305 1ST NF CARE MODERATE MDM 35: CPT | Performed by: INTERNAL MEDICINE

## 2021-09-28 PROCEDURE — P9603 ONE-WAY ALLOW PRORATED MILES: HCPCS | Performed by: NURSE PRACTITIONER

## 2021-09-28 RX ORDER — POTASSIUM CHLORIDE 1500 MG/1
20 TABLET, EXTENDED RELEASE ORAL DAILY
COMMUNITY
Start: 2021-09-28 | End: 2021-10-06

## 2021-09-28 NOTE — TELEPHONE ENCOUNTER
[facility] TELEPHONE NOTE    CC: Hypokalemia       ASSESSMENT/PLAN:  Potassium trending down since last week, not on supplement at present.  No offending medications.   Likely in setting of poor oral intake per chart review.  - Start KCl 20 mEq PO daily   - BMP 10/1, call NP with result   PRASHANT Mcguire CNP  09/28/21 12:47 PM

## 2021-09-30 ENCOUNTER — LAB REQUISITION (OUTPATIENT)
Dept: LAB | Facility: CLINIC | Age: 86
End: 2021-09-30

## 2021-09-30 DIAGNOSIS — E87.6 HYPOKALEMIA: ICD-10-CM

## 2021-10-01 LAB
ANION GAP SERPL CALCULATED.3IONS-SCNC: 10 MMOL/L (ref 3–14)
BUN SERPL-MCNC: 15 MG/DL (ref 7–30)
CALCIUM SERPL-MCNC: 7.6 MG/DL (ref 8.5–10.1)
CHLORIDE BLD-SCNC: 107 MMOL/L (ref 94–109)
CO2 SERPL-SCNC: 21 MMOL/L (ref 20–32)
CREAT SERPL-MCNC: 0.72 MG/DL (ref 0.52–1.04)
GFR SERPL CREATININE-BSD FRML MDRD: 78 ML/MIN/1.73M2
GLUCOSE BLD-MCNC: 69 MG/DL (ref 70–99)
POTASSIUM BLD-SCNC: 3.3 MMOL/L (ref 3.4–5.3)
SODIUM SERPL-SCNC: 138 MMOL/L (ref 133–144)

## 2021-10-01 PROCEDURE — P9603 ONE-WAY ALLOW PRORATED MILES: HCPCS | Performed by: NURSE PRACTITIONER

## 2021-10-01 PROCEDURE — 36415 COLL VENOUS BLD VENIPUNCTURE: CPT | Performed by: NURSE PRACTITIONER

## 2021-10-01 PROCEDURE — 82374 ASSAY BLOOD CARBON DIOXIDE: CPT | Performed by: NURSE PRACTITIONER

## 2021-10-01 NOTE — PROGRESS NOTES
[place] [facility]  [facility] Medical Record Number:  [id number]    Pt was seen by Dr Figueroa on 9/28/21 at the [facility] ([facility] for an initial TCU visit      HPI:    Hedy English  is a 85 year old (1935), who is being seen today for an initial TCU visit      Pt is a 85 year old (1935) female admitted to [facility] ([facility] for rehabilitation s/p hospitalization [facility] 9/6-9/8/21 for due to weakness, decreased intake and UC + > 100K Proteus mirabilis and > 100K E coli and completed course of rocephin and ciprofloxacin (resolved). Noted to be nonambulatory since 06/2021 with unclear etiology. Family attribute to immobility.. Also noted to have short-term memory loss/questionable dementia with severe malnutrition.     PTA was living in a mobile home with her son.     PMH: cognitive impairment, anxiety, malnutrition      Pt has not made progress since admission to the TCU  She spends most of the day in bed, is fatigued even with sitting in bed  Appetite has been poor.Pt has intermittently c/o nausea.  She required IV fluids one week ago for the treatment of dehydration.  Family noted increased confusion with trial of remeron  Abd US 9/19/21 revealed nl liver, pancreas, trace ascites.  Albumin on 9/19 was 1.5  Transient elevation of transaminases over the last 2 weeks, AST and ALT essentially nl 9/19/21    Pt has not had any specific physical concerns  She only c/o feeing tired  She denies pain, abd pain, nausea, diarrhea, fevers, chills, dysuria     SLUMS 14/30    PMH:  Depression  Diverticulitis      Current Outpatient Medications   Medication Sig Dispense Refill     acetaminophen (TYLENOL) 325 MG tablet Take 650 mg by mouth every 6 hours as needed for mild pain       bisacodyl (DULCOLAX) 10 MG suppository Place 10 mg rectally daily as needed for constipation       dorzolamide-timolol (COSOPT) 2-0.5 % ophthalmic solution PLACE 1 DROP INTO BOTH EYES TWICE DAILY       folic  acid (FOLVITE) 400 MCG tablet Take 400 mcg by mouth daily       multivitamin w/minerals (THERA-VIT-M) tablet Take 1 tablet by mouth daily       ondansetron (ZOFRAN) 4 MG tablet Take 4 mg by mouth every 6 hours as needed for nausea       PARoxetine (PAXIL) 20 MG tablet Take 20 mg by mouth daily       potassium chloride ER (KLOR-CON M) 20 MEQ CR tablet Take 1 tablet (20 mEq) by mouth daily       SENNA-docusate sodium (SENNA S) 8.6-50 MG tablet Take 1 tablet by mouth 2 times daily And 1 tablet BID PRN           REVIEW OF SYSTEMS:  10 point ROS neg except as noted above      Objective:  There were no vitals taken for this visit.   Wt Readings from Last 2 Encounters:   09/26/21 55.2 kg (121 lb 9.6 oz)   09/20/21 54.9 kg (121 lb)       Exam:  GENERAL APPEARANCE:  Alert, in no distress, lying in bed, thin appearing  ENT:  Oral mucosa moist  EYES: no eye redness or drainage  NECK:  supple  RESP: lungs clear  CV:  RRR  ABDOMEN: soft, non-tender, non-distended  M/S:  Loss of muscle mass  No LE edema  SKIN: no rash  NEURO:  Alert oriented to person, pleasant, does not appear depressed  Generalized weakness        ASSESSMENT/PLAN:    Severe malnutrition (H)  Chronic severe malnutrition r/t decreased appetite, inability to care for self at home, bedbound since 06/2021.  Body mass index is 17.07 kg/m .  Etiology not clear, differential includes dementia, depression (Pt dose not appear depressed), occult malignancy. Trace ascites noted on US may be secondary to low albumin, Unclear etiology for transient mild elevation of transaminases, in view of normal appearing liver on ultrasound  Plan monitor mental and functional status, consider CT abd to f/u ultrasound    Generalized anxiety disorder  Continue Paxil, provide emotional support      Cognitive impairment  Physical deconditioning  Ongoing poor functional status  Plan therapies, as Pt is able  Disposition unclear  Ongoing review of goal of care with Pt's family        Edwardo FREEDMAN  MD [place]

## 2021-10-04 ENCOUNTER — LAB REQUISITION (OUTPATIENT)
Dept: LAB | Facility: CLINIC | Age: 86
End: 2021-10-04

## 2021-10-04 DIAGNOSIS — E87.6 HYPOKALEMIA: ICD-10-CM

## 2021-10-05 LAB
ANION GAP SERPL CALCULATED.3IONS-SCNC: 8 MMOL/L (ref 3–14)
BUN SERPL-MCNC: 19 MG/DL (ref 7–30)
CALCIUM SERPL-MCNC: 7.9 MG/DL (ref 8.5–10.1)
CHLORIDE BLD-SCNC: 108 MMOL/L (ref 94–109)
CO2 SERPL-SCNC: 21 MMOL/L (ref 20–32)
CREAT SERPL-MCNC: 0.75 MG/DL (ref 0.52–1.04)
GFR SERPL CREATININE-BSD FRML MDRD: 74 ML/MIN/1.73M2
GLUCOSE BLD-MCNC: 81 MG/DL (ref 70–99)
POTASSIUM BLD-SCNC: 4.3 MMOL/L (ref 3.4–5.3)
SODIUM SERPL-SCNC: 137 MMOL/L (ref 133–144)

## 2021-10-05 PROCEDURE — 80048 BASIC METABOLIC PNL TOTAL CA: CPT | Performed by: NURSE PRACTITIONER

## 2021-10-05 PROCEDURE — 36415 COLL VENOUS BLD VENIPUNCTURE: CPT | Performed by: NURSE PRACTITIONER

## 2021-10-06 ENCOUNTER — TRANSITIONAL CARE UNIT VISIT (OUTPATIENT)
Dept: GERIATRICS | Facility: CLINIC | Age: 86
End: 2021-10-06
Payer: COMMERCIAL

## 2021-10-06 VITALS
SYSTOLIC BLOOD PRESSURE: 122 MMHG | OXYGEN SATURATION: 93 % | HEIGHT: 67 IN | RESPIRATION RATE: 16 BRPM | TEMPERATURE: 97.8 F | BODY MASS INDEX: 19.05 KG/M2 | HEART RATE: 77 BPM | DIASTOLIC BLOOD PRESSURE: 83 MMHG

## 2021-10-06 DIAGNOSIS — K59.01 SLOW TRANSIT CONSTIPATION: ICD-10-CM

## 2021-10-06 DIAGNOSIS — R41.89 COGNITIVE IMPAIRMENT: Primary | ICD-10-CM

## 2021-10-06 DIAGNOSIS — R53.81 PHYSICAL DECONDITIONING: ICD-10-CM

## 2021-10-06 PROCEDURE — 99309 SBSQ NF CARE MODERATE MDM 30: CPT | Performed by: NURSE PRACTITIONER

## 2021-10-06 NOTE — PROGRESS NOTES
[place] [facility]  [facility] Medical Record Number:  [id number]  Place of Service where encounter took place:  [facility] (TCU) [[id number]]  Chief Complaint   Patient presents with     Nursing Home Acute       HPI:    Hedy English  is a 85 year old (1935), who is being seen today for an episodic care visit.  HPI information obtained from: facility chart records, facility staff and patient report. Today's concern is:    Patient Hedy English is a 85 year old (1935) female admitted to [facility] for rehabilitation s/p hospitalization [facility] 9/6-9/8/21 for due to weakness, decreased intake and UC + > 100K Proteus mirabilis and > 100K E coli and completed course of rocephin and ciprofloxacin (resolved). Noted to be nonambulatory since 06/2021 with unclear etiology. Family attribute to immobility.. Also noted to have short-term memory loss/questionable dementia with severe malnutrition.     PMH: cognitive impairment, anxiety, malnutrition       Cognitive impairment  Physical deconditioning  Slow transit constipation     Max assist all cares. Therapies working on patient sitting up  Variable intake at meals and patient report she feeds her self, weight stable ~121lb  Recent CT chest   Recommendation to consider CT abd/pelvis due to failure to thrive w/malnutrition, weight loss.   Per Epic this was discussed with daughter Kathy and they were going to review with family if that wanted further workup  Had some gastrointestinal upset 2 days ago and this has resolved per patient   Having regular BM      Past Medical and Surgical History reviewed in Epic today.    MEDICATIONS:    Current Outpatient Medications   Medication Sig Dispense Refill     acetaminophen (TYLENOL) 325 MG tablet Take 650 mg by mouth every 6 hours as needed for mild pain       bisacodyl (DULCOLAX) 10 MG suppository Place 10 mg rectally daily as needed for constipation        "dorzolamide-timolol (COSOPT) 2-0.5 % ophthalmic solution PLACE 1 DROP INTO BOTH EYES TWICE DAILY       folic acid (FOLVITE) 400 MCG tablet Take 400 mcg by mouth daily       multivitamin w/minerals (THERA-VIT-M) tablet Take 1 tablet by mouth daily       ondansetron (ZOFRAN) 4 MG tablet Take 4 mg by mouth every 6 hours as needed for nausea       PARoxetine (PAXIL) 20 MG tablet Take 20 mg by mouth daily       POTASSIUM CHLORIDE ER PO Take 30 mEq by mouth daily       SENNA-docusate sodium (SENNA S) 8.6-50 MG tablet Take 1 tablet by mouth 2 times daily And 1 tablet BID PRN           REVIEW OF SYSTEMS:  4 point ROS including Respiratory, CV, GI and , other than that noted in the HPI,  is negative    Objective:  /83   Pulse 77   Temp 97.8  F (36.6  C)   Resp 16   Ht 1.702 m (5' 7\")   SpO2 93%   BMI 19.05 kg/m    Exam:  GENERAL APPEARANCE:  Alert, in no distress  ENT:  Mouth and posterior oropharynx normal, moist mucous membranes  NECK:  No adenopathy,masses or thyromegaly  RESP:  respiratory effort and palpation of chest normal, lungs clear to auscultation , no respiratory distress  CV:  Palpation and auscultation of heart done , regular rate and rhythm, no murmur, rub, or gallop  ABDOMEN:  normal bowel sounds, soft, nontender, no hepatosplenomegaly or other masses  M/S:   sitting up in bed  SKIN:  Inspection of skin and subcutaneous tissue baseline  NEURO:   Cranial nerves 2-12 are normal tested and grossly at patient's baseline  PSYCH:  affect and mood normal    Labs:   Labs done in SNF are in [place] EPIC. Please refer to them using EPIC/Care Everywhere.    ASSESSMENT/PLAN:     Cognitive impairment  Physical deconditioning  Slow transit constipation    Max assist all cares. Therapies working on patient sitting up  Continue with therapies at this time, however, due to slow progress likely plateau. Nursing them can try and get her up  Variable intake at meals and patient report she feeds her self, weight " stable ~121lb, Weight 1 yr ago ~170-180  Recent CT chest noted, no acute findings  Recommendation to consider CT abd/pelvis due to failure to thrive w/malnutrition, weight loss.   Per Epic this was discussed with daughter Kathy and they were going to review with family if they wanted this workup  Had some gastrointestinal upset 2 days ago and this has resolved per patient   Having regular bowel movement  Monitor   Call out to daughter Kathy to review patient status and poor progress in therapies  Patient would likely qualify for hospice given >50lb weight loss, inability to sit up and failure to thrive  Pending return call    Follow up CMP,CBC, TSH 10/8/21      Electronically signed by:  PRASHANT Leonard CNP

## 2021-10-07 ENCOUNTER — LAB REQUISITION (OUTPATIENT)
Dept: LAB | Facility: CLINIC | Age: 86
End: 2021-10-07

## 2021-10-07 DIAGNOSIS — D64.9 ANEMIA, UNSPECIFIED: ICD-10-CM

## 2021-10-08 ENCOUNTER — TELEPHONE (OUTPATIENT)
Dept: GERIATRICS | Facility: CLINIC | Age: 86
End: 2021-10-08

## 2021-10-08 LAB
ALBUMIN SERPL-MCNC: 1.6 G/DL (ref 3.4–5)
ALP SERPL-CCNC: 203 U/L (ref 40–150)
ALT SERPL W P-5'-P-CCNC: 29 U/L (ref 0–50)
ANION GAP SERPL CALCULATED.3IONS-SCNC: 8 MMOL/L (ref 3–14)
AST SERPL W P-5'-P-CCNC: 41 U/L (ref 0–45)
BILIRUB SERPL-MCNC: 0.6 MG/DL (ref 0.2–1.3)
BUN SERPL-MCNC: 17 MG/DL (ref 7–30)
CALCIUM SERPL-MCNC: 7.9 MG/DL (ref 8.5–10.1)
CHLORIDE BLD-SCNC: 105 MMOL/L (ref 94–109)
CO2 SERPL-SCNC: 22 MMOL/L (ref 20–32)
CREAT SERPL-MCNC: 0.76 MG/DL (ref 0.52–1.04)
ERYTHROCYTE [DISTWIDTH] IN BLOOD BY AUTOMATED COUNT: 17.2 % (ref 10–15)
GFR SERPL CREATININE-BSD FRML MDRD: 73 ML/MIN/1.73M2
GLUCOSE BLD-MCNC: 90 MG/DL (ref 70–99)
HCT VFR BLD AUTO: 34.7 % (ref 35–47)
HGB BLD-MCNC: 10.7 G/DL (ref 11.7–15.7)
MCH RBC QN AUTO: 30.8 PG (ref 26.5–33)
MCHC RBC AUTO-ENTMCNC: 30.8 G/DL (ref 31.5–36.5)
MCV RBC AUTO: 100 FL (ref 78–100)
PLATELET # BLD AUTO: 321 10E3/UL (ref 150–450)
POTASSIUM BLD-SCNC: 4 MMOL/L (ref 3.4–5.3)
PROT SERPL-MCNC: 6.1 G/DL (ref 6.8–8.8)
RBC # BLD AUTO: 3.47 10E6/UL (ref 3.8–5.2)
SODIUM SERPL-SCNC: 135 MMOL/L (ref 133–144)
TSH SERPL DL<=0.005 MIU/L-ACNC: 1.56 MU/L (ref 0.4–4)
WBC # BLD AUTO: 7.3 10E3/UL (ref 4–11)

## 2021-10-08 PROCEDURE — 36415 COLL VENOUS BLD VENIPUNCTURE: CPT | Performed by: NURSE PRACTITIONER

## 2021-10-08 PROCEDURE — P9604 ONE-WAY ALLOW PRORATED TRIP: HCPCS | Performed by: NURSE PRACTITIONER

## 2021-10-08 PROCEDURE — 84443 ASSAY THYROID STIM HORMONE: CPT | Performed by: NURSE PRACTITIONER

## 2021-10-08 PROCEDURE — 80053 COMPREHEN METABOLIC PANEL: CPT | Performed by: NURSE PRACTITIONER

## 2021-10-08 PROCEDURE — 85027 COMPLETE CBC AUTOMATED: CPT | Performed by: NURSE PRACTITIONER

## 2021-10-12 ENCOUNTER — TRANSITIONAL CARE UNIT VISIT (OUTPATIENT)
Dept: GERIATRICS | Facility: CLINIC | Age: 86
End: 2021-10-12
Payer: COMMERCIAL

## 2021-10-12 VITALS
SYSTOLIC BLOOD PRESSURE: 143 MMHG | WEIGHT: 118.6 LBS | HEIGHT: 67 IN | DIASTOLIC BLOOD PRESSURE: 83 MMHG | BODY MASS INDEX: 18.62 KG/M2 | TEMPERATURE: 97.3 F | HEART RATE: 92 BPM | RESPIRATION RATE: 18 BRPM | OXYGEN SATURATION: 90 %

## 2021-10-12 DIAGNOSIS — R53.81 PHYSICAL DECONDITIONING: Primary | ICD-10-CM

## 2021-10-12 DIAGNOSIS — E43 SEVERE MALNUTRITION (H): ICD-10-CM

## 2021-10-12 DIAGNOSIS — K59.01 SLOW TRANSIT CONSTIPATION: ICD-10-CM

## 2021-10-12 DIAGNOSIS — R74.8 ELEVATED LIVER ENZYMES: ICD-10-CM

## 2021-10-12 DIAGNOSIS — F41.1 GENERALIZED ANXIETY DISORDER: ICD-10-CM

## 2021-10-12 DIAGNOSIS — D64.9 NORMOCYTIC ANEMIA: ICD-10-CM

## 2021-10-12 PROCEDURE — 99309 SBSQ NF CARE MODERATE MDM 30: CPT | Performed by: PHYSICIAN ASSISTANT

## 2021-10-12 ASSESSMENT — MIFFLIN-ST. JEOR: SCORE: 1020.6

## 2021-10-12 NOTE — PROGRESS NOTES
[facility] Medical Record Number:  [id number]  Place of Service where encounter took place:  [facility] ([facility] [[id number]]  Chief Complaint   Patient presents with     Nursing Home Acute       HPI:    Hedy English  is a 86 year old (1935), who is being seen today for an episodic care visit.  HPI information obtained from: facility chart records, patient report and [facility] chart review. Today's concern is:  [facility] Medical Record Number:  [id number]  Place of Service where encounter took place:  [facility] ([facility]) [[id number]]  Chief Complaint   Patient presents with     Nursing Home Acute       HPI:    Hedy English  is a 85 year old (1935), who is being seen today for an episodic care visit.  HPI information obtained from: facility chart records, facility staff and patient report. Today's concern is:    Patient Hedy English is a 85 year old (1935) female admitted to [facility] for rehabilitation s/p hospitalization [facility] 9/6-9/8/21 for due to weakness, decreased intake and UC + > 100K Proteus mirabilis and > 100K E coli and completed course of rocephin and ciprofloxacin (resolved). Noted to be nonambulatory since 06/2021 with unclear etiology. Family attribute to immobility.. Also noted to have short-term memory loss/questionable dementia with severe malnutrition.        Physical deconditioning  Severe malnutrition (H)  Slow transit constipation  Elevated liver enzymes  Generalized anxiety disorder  Normocytic anemia     Continues to require extensive assistance with all cares. Eating 25-50% most meals.  Weight drop slightly, but ?acurracery. Had previously been stable in 120 lbs.  Repeat labs with persistently elevated alk phos and remainder of LFTS normal.  Denies further abdominal pain or GI upset. Having regular BMs  States had some mild SOB yesterday  "that resolved, denies chest pain, cough or ST  States mood stable. She feels therapy is going well, though admits she doesn't leave the room.      Past Medical and Surgical History reviewed in Epic today.    MEDICATIONS:    Current Outpatient Medications   Medication Sig Dispense Refill     acetaminophen (TYLENOL) 325 MG tablet Take 650 mg by mouth every 6 hours as needed for mild pain       bisacodyl (DULCOLAX) 10 MG suppository Place 10 mg rectally daily as needed for constipation       dorzolamide-timolol (COSOPT) 2-0.5 % ophthalmic solution PLACE 1 DROP INTO BOTH EYES TWICE DAILY       folic acid (FOLVITE) 400 MCG tablet Take 400 mcg by mouth daily       multivitamin w/minerals (THERA-VIT-M) tablet Take 1 tablet by mouth daily       ondansetron (ZOFRAN) 4 MG tablet Take 4 mg by mouth every 6 hours as needed for nausea       PARoxetine (PAXIL) 20 MG tablet Take 20 mg by mouth daily       POTASSIUM CHLORIDE ER PO Take 30 mEq by mouth daily       SENNA-docusate sodium (SENNA S) 8.6-50 MG tablet Take 1 tablet by mouth 2 times daily And 1 tablet BID PRN           REVIEW OF SYSTEMS:  4 point ROS including Respiratory, CV, GI and , other than that noted in the HPI,  is negative    Objective:  BP (!) 143/83   Pulse 92   Temp 97.3  F (36.3  C)   Resp 18   Ht 1.702 m (5' 7\")   Wt 53.8 kg (118 lb 9.6 oz)   SpO2 90%   BMI 18.58 kg/m    Exam:  Physical Exam  Vitals and nursing note reviewed.   Constitutional:       Comments: Frail appearing, bitemporal wasting   HENT:      Head: Normocephalic and atraumatic.   Eyes:      General: No scleral icterus.  Cardiovascular:      Heart sounds: Normal heart sounds. No murmur heard.     Pulmonary:      Effort: Pulmonary effort is normal.      Breath sounds: No wheezing or rales.   Abdominal:      General: Abdomen is flat. Bowel sounds are normal. There is no distension.      Tenderness: There is no abdominal tenderness.   Musculoskeletal:      Right lower leg: No edema.      " Left lower leg: No edema.   Skin:     General: Skin is warm and dry.      Findings: No rash.   Neurological:      General: No focal deficit present.      Mental Status: She is oriented to person, place, and time.      Cranial Nerves: No cranial nerve deficit.   Psychiatric:         Mood and Affect: Mood normal.         Behavior: Behavior normal.           Labs:   Labs done in SNF are in [place] EPIC. Please refer to them using EPIC/Care Everywhere.    ASSESSMENT/PLAN:  Physical Deconditioning  Severe Malnutrition:   -Appears to have plateaued with therapy.  Requiring significant assistance with bed mobility and meals.  Persistently taking in 25-50 % of meals.  Appears to be taking Magic cup twice daily at minimal qualities.  Weights have been persistently stable though recent drop from 124-118, though question accuracy.  -Previous recommendation to consider CT of the abdomen and pelvis for further evaluation given elevated LFTs, failure to thrive and weight loss.  Dr. Figueroa had discussed with daughter 9/28 and she was going to discuss with family.  Potentially would help with formal diagnosis, though doubt would change outcome given patient's poor functional status  -Continue therapies as tolerated.  Discussed with SW, planning care conference to discuss transitioning to LTC  - Repeat CMP in 1 week for malnutrition     Slow transit constipation  -Having regular bowel movements.  Continue as needed Dulcolax and senna S    Elevated liver enzymes: Alk phos persistently elevated at 200.  Previously normal in care everywhere 5/2021.  Mild elevation in AST/ALT has normalized.  Continues to deny abdominal pain  - CMP with GGT in 1 week    Generalized Anxiety disorder: Patient reports mood and anxiety symptoms stable  - Continue Paxil    Chronic Normocytic Anemia   - Hgb stable 10.5    Electronically signed by:  Anastasiia Cooley PA-C

## 2021-10-13 NOTE — TELEPHONE ENCOUNTER
Discussed with daughter Kathy option for CT abdomen for further workup due to patient's failure to thrive with malnutrition and weight loss.   Daughter decline due to difficulty with mobility with her mother's status at this time.    Offered comfort care team and referral to hospice. Daughter Kathy decline at this time.    Her goal is to take her mother home and hopes she will get stronger.    Plan to continue therapies at this time and future care conferences to discuss plan of care

## 2021-10-18 ENCOUNTER — LAB REQUISITION (OUTPATIENT)
Dept: LAB | Facility: CLINIC | Age: 86
End: 2021-10-18

## 2021-10-18 DIAGNOSIS — E44.1 MILD PROTEIN-CALORIE MALNUTRITION (H): ICD-10-CM

## 2021-10-19 LAB
ALBUMIN SERPL-MCNC: 1.3 G/DL (ref 3.4–5)
ALP SERPL-CCNC: 414 U/L (ref 40–150)
ALT SERPL W P-5'-P-CCNC: 95 U/L (ref 0–50)
ANION GAP SERPL CALCULATED.3IONS-SCNC: 6 MMOL/L (ref 3–14)
AST SERPL W P-5'-P-CCNC: 130 U/L (ref 0–45)
BILIRUB SERPL-MCNC: 0.4 MG/DL (ref 0.2–1.3)
BUN SERPL-MCNC: 18 MG/DL (ref 7–30)
CALCIUM SERPL-MCNC: 8 MG/DL (ref 8.5–10.1)
CHLORIDE BLD-SCNC: 103 MMOL/L (ref 94–109)
CO2 SERPL-SCNC: 23 MMOL/L (ref 20–32)
CREAT SERPL-MCNC: 0.59 MG/DL (ref 0.52–1.04)
GFR SERPL CREATININE-BSD FRML MDRD: 84 ML/MIN/1.73M2
GGT SERPL-CCNC: 194 U/L (ref 0–40)
GLUCOSE BLD-MCNC: 85 MG/DL (ref 70–99)
POTASSIUM BLD-SCNC: 4.4 MMOL/L (ref 3.4–5.3)
PROT SERPL-MCNC: 6.4 G/DL (ref 6.8–8.8)
SODIUM SERPL-SCNC: 132 MMOL/L (ref 133–144)

## 2021-10-19 PROCEDURE — P9603 ONE-WAY ALLOW PRORATED MILES: HCPCS | Performed by: NURSE PRACTITIONER

## 2021-10-19 PROCEDURE — 82977 ASSAY OF GGT: CPT | Performed by: NURSE PRACTITIONER

## 2021-10-19 PROCEDURE — 36415 COLL VENOUS BLD VENIPUNCTURE: CPT | Performed by: NURSE PRACTITIONER

## 2021-10-19 PROCEDURE — 82040 ASSAY OF SERUM ALBUMIN: CPT | Performed by: NURSE PRACTITIONER

## 2021-10-20 ENCOUNTER — TRANSITIONAL CARE UNIT VISIT (OUTPATIENT)
Dept: GERIATRICS | Facility: CLINIC | Age: 86
End: 2021-10-20
Payer: COMMERCIAL

## 2021-10-20 VITALS
OXYGEN SATURATION: 94 % | DIASTOLIC BLOOD PRESSURE: 76 MMHG | HEART RATE: 95 BPM | HEIGHT: 67 IN | SYSTOLIC BLOOD PRESSURE: 116 MMHG | WEIGHT: 118.6 LBS | RESPIRATION RATE: 18 BRPM | TEMPERATURE: 98.4 F | BODY MASS INDEX: 18.62 KG/M2

## 2021-10-20 DIAGNOSIS — R53.81 PHYSICAL DECONDITIONING: Primary | ICD-10-CM

## 2021-10-20 DIAGNOSIS — E43 SEVERE MALNUTRITION (H): ICD-10-CM

## 2021-10-20 DIAGNOSIS — F41.1 GENERALIZED ANXIETY DISORDER: ICD-10-CM

## 2021-10-20 DIAGNOSIS — K59.01 SLOW TRANSIT CONSTIPATION: ICD-10-CM

## 2021-10-20 DIAGNOSIS — R74.8 ELEVATED LIVER ENZYMES: ICD-10-CM

## 2021-10-20 PROCEDURE — 99316 NF DSCHRG MGMT 30 MIN+: CPT | Performed by: NURSE PRACTITIONER

## 2021-10-20 ASSESSMENT — MIFFLIN-ST. JEOR: SCORE: 1020.6

## 2021-10-20 NOTE — PROGRESS NOTES
[facility] DISCHARGE SUMMARY  PATIENT'S NAME: Hedy English  YOB: 1935  MEDICAL RECORD NUMBER:  [id number]  Place of Service where encounter took place:  [facility] ([facility]) [[id number]]    PRIMARY CARE PROVIDER AND CLINIC RESPONSIBLE AFTER TRANSFER:   [facility] [place] [facility], [address] [facility] / [place] [address]    Non-G Provider     Transferring providers: PRASHANT Leonard CNP, MD [place]  Recent Hospitalization/ED:  Hospital  [facility] Hospital stay 9/6/21 to 9/8/21.  Date of SNF Admission: 9/8/21  Date of SNF (anticipated) Discharge: 10/21/21  Discharged to: previous independent home and with family   Cognitive Scores/ Physical Function/ DME:    Ambulation: unable   Standing: Max, fatigue with sitting/transfers and anxiety   Transfers: Max   Bed Mobility: Max   SLUMS: 14/30   UE Dressing: Max   LE Dressing: Max   Toileting: Dep   Groom/Hygiene: Mod A    CODE STATUS/ADVANCE DIRECTIVES DISCUSSION:  DNR / DNI   ALLERGIES: Patient has no known allergies.    DISCHARGE DIAGNOSIS/NURSING FACILITY COURSE:     Patient Hedy English is a 85 year old (1935) female admitted to [facility] for rehabilitation s/p hospitalization [facility] 9/6-9/8/21 for due to weakness, decreased intake and UC + > 100K Proteus mirabilis and > 100K E coli and completed course of rocephin and ciprofloxacin (resolved). Noted to be nonambulatory since 06/2021 with unclear etiology. Family attribute to immobility.. Also noted to have short-term memory loss/questionable dementia with severe malnutrition.     PMH: cognitive impairment, anxiety, malnutrition    Elevated liver enzymes  Had schedule abdominal ultrasound this weekend regarding, however, spoke to daughter Ktahy and she elect to take patient home and follow up with primary care provider regarding.   Patient complete therapies due to plateau and TCU stay likely not  covered  Has follow up primary care provider tomorrow.     Severe malnutrition (H)  Chronic severe malnutrition r/t decreased appetite, inability to care for self at home, bedbound since 06/2021.  Body mass index is 17.07 kg/m .  Weight stable ~121lb at TCU  Continue dietary supplements  Monitor intakes  If continue to not thrive and further weight loss consider comfort cares or hospice referral    Generalized anxiety disorder  Anxious at times  Continue Paxil, provide emotional support  Consider referral house psychologist    Cognitive impairment  Physical deconditioning  short term memory loss vs dementia. Ongoing physical deconditioning since 06/2021, has been primarily nonambulatory since then.  Continue APM due to immobility   ST to eval/treat cognition   Coordinated with  and patient liives with son that has cognitive impairment. Goal is to return home, however, patient would need additional resources (care giver training, assistance with meals, activities of daily living,and medication administration) if to return home or long term care.    Coordinated with therapies on patient progress. Very deconditioned with difficulty sitting up.  Plan is to trial Broda chair to get up for meals  Coordinated with nursing and patient not getting up for meals and no acute concerns with eating /swallowing    Advanced care planning   Patient is DNR/DNI     Past Medical History:  has no past medical history on file.    Discharge Medications:    Current Outpatient Medications   Medication Sig Dispense Refill     acetaminophen (TYLENOL) 325 MG tablet Take 650 mg by mouth every 6 hours as needed for mild pain       bisacodyl (DULCOLAX) 10 MG suppository Place 10 mg rectally daily as needed for constipation       dorzolamide-timolol (COSOPT) 2-0.5 % ophthalmic solution PLACE 1 DROP INTO BOTH EYES TWICE DAILY       folic acid (FOLVITE) 400 MCG tablet Take 400 mcg by mouth daily       multivitamin w/minerals  "(THERA-VIT-M) tablet Take 1 tablet by mouth daily       ondansetron (ZOFRAN) 4 MG tablet Take 4 mg by mouth every 6 hours as needed for nausea       PARoxetine (PAXIL) 20 MG tablet Take 20 mg by mouth daily       POTASSIUM CHLORIDE ER PO Take 30 mEq by mouth daily       SENNA-docusate sodium (SENNA S) 8.6-50 MG tablet Take 1 tablet by mouth 2 times daily And 1 tablet BID PRN         Medication Changes/Rationale:         Controlled medications sent with patient:   not applicable/none     ROS:   4 point ROS including Respiratory, CV, GI and , other than that noted in the HPI,  is negative    Physical Exam:   Vitals: /76   Pulse 95   Temp 98.4  F (36.9  C)   Resp 18   Ht 1.702 m (5' 7\")   Wt 53.8 kg (118 lb 9.6 oz)   SpO2 94%   BMI 18.58 kg/m    BMI= Body mass index is 18.58 kg/m .  GENERAL APPEARANCE:  Alert, in no distress  ENT:  Mouth and posterior oropharynx normal, moist mucous membranes  EYES:  EOM, conjunctivae, lids, pupils and irises normal  NECK:  No adenopathy,masses or thyromegaly  RESP:  respiratory effort and palpation of chest normal, lungs clear to auscultation , no respiratory distress  CV:  Palpation and auscultation of heart done , regular rate and rhythm, no murmur, rub, or gallop  ABDOMEN:  normal bowel sounds, soft, nontender, no hepatosplenomegaly or other masses  M/S:   lying in bed  SKIN:  Inspection of skin and subcutaneous tissue baseline  NEURO:   Cranial nerves 2-12 are normal tested and grossly at patient's baseline  PSYCH:  affect and mood normal     SNF labs: Labs done in SNF are in [place] EPIC. Please refer to them using EPIC/Care Everywhere.     Last Comprehensive Metabolic Panel:  Sodium   Date Value Ref Range Status   10/19/2021 132 (L) 133 - 144 mmol/L Final   09/20/2020 136 133 - 144 mmol/L Final     Potassium   Date Value Ref Range Status   10/19/2021 4.4 3.4 - 5.3 mmol/L Final   09/20/2020 3.9 3.4 - 5.3 mmol/L Final     Chloride   Date Value Ref Range Status "   10/19/2021 103 94 - 109 mmol/L Final   09/20/2020 104 94 - 109 mmol/L Final     Carbon Dioxide   Date Value Ref Range Status   09/20/2020 25 20 - 32 mmol/L Final     Carbon Dioxide (CO2)   Date Value Ref Range Status   10/19/2021 23 20 - 32 mmol/L Final     Anion Gap   Date Value Ref Range Status   10/19/2021 6 3 - 14 mmol/L Final   09/20/2020 7 3 - 14 mmol/L Final     Glucose   Date Value Ref Range Status   10/19/2021 85 70 - 99 mg/dL Final   09/20/2020 118 (H) 70 - 99 mg/dL Final     Urea Nitrogen   Date Value Ref Range Status   10/19/2021 18 7 - 30 mg/dL Final   09/20/2020 25 7 - 30 mg/dL Final     Creatinine   Date Value Ref Range Status   10/19/2021 0.59 0.52 - 1.04 mg/dL Final   09/20/2020 0.88 0.52 - 1.04 mg/dL Final     GFR Estimate   Date Value Ref Range Status   10/19/2021 84 >60 mL/min/1.73m2 Final     Comment:     As of July 11, 2021, eGFR is calculated by the CKD-EPI creatinine equation, without race adjustment. eGFR can be influenced by muscle mass, exercise, and diet. The reported eGFR is an estimation only and is only applicable if the renal function is stable.   09/20/2020 61 >60 mL/min/[1.73_m2] Final     Comment:     Non  GFR Calc  Starting 12/18/2018, serum creatinine based estimated GFR (eGFR) will be   calculated using the Chronic Kidney Disease Epidemiology Collaboration   (CKD-EPI) equation.       Calcium   Date Value Ref Range Status   10/19/2021 8.0 (L) 8.5 - 10.1 mg/dL Final   09/20/2020 9.6 8.5 - 10.1 mg/dL Final     Bilirubin Total   Date Value Ref Range Status   10/19/2021 0.4 0.2 - 1.3 mg/dL Final   11/23/2007 0.7 0.2 - 1.3 mg/dL Final     Alkaline Phosphatase   Date Value Ref Range Status   10/19/2021 414 (H) 40 - 150 U/L Final   11/23/2007 88 40 - 150 U/L Final     ALT   Date Value Ref Range Status   10/19/2021 95 (H) 0 - 50 U/L Final   11/23/2007 28 0 - 50 U/L Final     AST   Date Value Ref Range Status   10/19/2021 130 (H) 0 - 45 U/L Final   11/23/2007 24 0 - 45  U/L Final         Lab Results   Component Value Date    WBC 7.3 10/08/2021    WBC 10.8 09/20/2020     Lab Results   Component Value Date    RBC 3.47 10/08/2021    RBC 4.02 09/20/2020     Lab Results   Component Value Date    HGB 10.7 10/08/2021    HGB 12.1 09/20/2020     Lab Results   Component Value Date    HCT 34.7 10/08/2021    HCT 38.7 09/20/2020     No components found for: MCT  Lab Results   Component Value Date     10/08/2021    MCV 96 09/20/2020     Lab Results   Component Value Date    MCH 30.8 10/08/2021    MCH 30.1 09/20/2020     Lab Results   Component Value Date    MCHC 30.8 10/08/2021    MCHC 31.3 09/20/2020     Lab Results   Component Value Date    RDW 17.2 10/08/2021    RDW 13.2 09/20/2020     Lab Results   Component Value Date     10/08/2021     09/20/2020       Ref Range & Units 1 d ago    GGT 0 - 40 U/L 194High             DISCHARGE PLAN:    Follow up labs: follow up CMP Monday 10/25/21    Medical Follow Up:      Follow up with primary care provider in tomorrow and recommend follow up abdominal ultrasound or CT    Appointment; 10/21 1050am [facility]; PCP [address] [phone] [facility] p/u 10am [phone]      MTM referral needed and placed by this provider: No    Current [place] scheduled appointments:       Discharge Services: Home Care:  Occupational Therapy, Physical Therapy, Registered Nurse, Home Health Aide and  NON-[place]               TOTAL DISCHARGE TIME:   Greater than 30 minutes  Electronically signed by:  PRASHANT Leonard CNP     Home care Face to Face documentation done in [facility] attached to Home care orders for [place] homecare.                  patient

## 2021-10-20 NOTE — LETTER
10/20/2021        RE: Hedy English  [address]  [place] [address]        [place] [facility] DISCHARGE SUMMARY  PATIENT'S NAME: Hedy English  YOB: 1935  MEDICAL RECORD NUMBER:  [id number]  Place of Service where encounter took place:  [facility] (TC[facility]) [[id number]]    PRIMARY CARE PROVIDER AND CLINIC RESPONSIBLE AFTER TRANSFER:   [facility] [place] [facility], [address] [facility] / [place] [address]    Non-[facility] Provider     Transferring providers: Jaz Brown, PRASHANT BISWAS, MD [place]  Recent Hospitalization/ED:  [facility] Hospital stay 9/6/21 to 9/8/21.  Date of SNF Admission: 9/8/21  Date of SNF (anticipated) Discharge: 10/21/21  Discharged to: previous independent home and with family   Cognitive Scores/ Physical Function/ DME:    Ambulation: unable   Standing: Max, fatigue with sitting/transfers and anxiety   Transfers: Max   Bed Mobility: Max   SLUMS: 14/30   UE Dressing: Max   LE Dressing: Max   Toileting: Dep   Groom/Hygiene: Mod A    CODE STATUS/ADVANCE DIRECTIVES DISCUSSION:  DNR / DNI   ALLERGIES: Patient has no known allergies.    DISCHARGE DIAGNOSIS/NURSING FACILITY COURSE:     Patient Hedy English is a 85 year old (1935) female admitted to [facility] for rehabilitation s/p hospitalization [facility] 9/6-9/8/21 for due to weakness, decreased intake and UC + > 100K Proteus mirabilis and > 100K E coli and completed course of rocephin and ciprofloxacin (resolved). Noted to be nonambulatory since 06/2021 with unclear etiology. Family attribute to immobility.. Also noted to have short-term memory loss/questionable dementia with severe malnutrition.     PMH: cognitive impairment, anxiety, malnutrition    Elevated liver enzymes  Had schedule abdominal ultrasound this weekend regarding, however, spoke to daughter Kathy and she elect to take patient home and follow up  with primary care provider regarding.   Patient complete therapies due to plateau and TCU stay likely not covered  Has follow up primary care provider tomorrow.     Severe malnutrition (H)  Chronic severe malnutrition r/t decreased appetite, inability to care for self at home, bedbound since 06/2021.  Body mass index is 17.07 kg/m .  Weight stable ~121lb at TCU  Continue dietary supplements  Monitor intakes  If continue to not thrive and further weight loss consider comfort cares or hospice referral    Generalized anxiety disorder  Anxious at times  Continue Paxil, provide emotional support  Consider referral house psychologist    Cognitive impairment  Physical deconditioning  short term memory loss vs dementia. Ongoing physical deconditioning since 06/2021, has been primarily nonambulatory since then.  Continue APM due to immobility   ST to eval/treat cognition   Coordinated with  and patient liives with son that has cognitive impairment. Goal is to return home, however, patient would need additional resources (care giver training, assistance with meals, activities of daily living,and medication administration) if to return home or long term care.    Coordinated with therapies on patient progress. Very deconditioned with difficulty sitting up.  Plan is to trial Broda chair to get up for meals  Coordinated with nursing and patient not getting up for meals and no acute concerns with eating /swallowing    Advanced care planning   Patient is DNR/DNI     Past Medical History:  has no past medical history on file.    Discharge Medications:    Current Outpatient Medications   Medication Sig Dispense Refill     acetaminophen (TYLENOL) 325 MG tablet Take 650 mg by mouth every 6 hours as needed for mild pain       bisacodyl (DULCOLAX) 10 MG suppository Place 10 mg rectally daily as needed for constipation       dorzolamide-timolol (COSOPT) 2-0.5 % ophthalmic solution PLACE 1 DROP INTO BOTH EYES TWICE DAILY        "folic acid (FOLVITE) 400 MCG tablet Take 400 mcg by mouth daily       multivitamin w/minerals (THERA-VIT-M) tablet Take 1 tablet by mouth daily       ondansetron (ZOFRAN) 4 MG tablet Take 4 mg by mouth every 6 hours as needed for nausea       PARoxetine (PAXIL) 20 MG tablet Take 20 mg by mouth daily       POTASSIUM CHLORIDE ER PO Take 30 mEq by mouth daily       SENNA-docusate sodium (SENNA S) 8.6-50 MG tablet Take 1 tablet by mouth 2 times daily And 1 tablet BID PRN         Medication Changes/Rationale:         Controlled medications sent with patient:   not applicable/none     ROS:   4 point ROS including Respiratory, CV, GI and , other than that noted in the HPI,  is negative    Physical Exam:   Vitals: /76   Pulse 95   Temp 98.4  F (36.9  C)   Resp 18   Ht 1.702 m (5' 7\")   Wt 53.8 kg (118 lb 9.6 oz)   SpO2 94%   BMI 18.58 kg/m    BMI= Body mass index is 18.58 kg/m .  GENERAL APPEARANCE:  Alert, in no distress  ENT:  Mouth and posterior oropharynx normal, moist mucous membranes  EYES:  EOM, conjunctivae, lids, pupils and irises normal  NECK:  No adenopathy,masses or thyromegaly  RESP:  respiratory effort and palpation of chest normal, lungs clear to auscultation , no respiratory distress  CV:  Palpation and auscultation of heart done , regular rate and rhythm, no murmur, rub, or gallop  ABDOMEN:  normal bowel sounds, soft, nontender, no hepatosplenomegaly or other masses  M/S:   lying in bed  SKIN:  Inspection of skin and subcutaneous tissue baseline  NEURO:   Cranial nerves 2-12 are normal tested and grossly at patient's baseline  PSYCH:  affect and mood normal     SNF labs: Labs done in SNF are in [place] EPIC. Please refer to them using [facility]/Care Everywhere.     Last Comprehensive Metabolic Panel:  Sodium   Date Value Ref Range Status   10/19/2021 132 (L) 133 - 144 mmol/L Final   09/20/2020 136 133 - 144 mmol/L Final     Potassium   Date Value Ref Range Status   10/19/2021 4.4 3.4 - 5.3 mmol/L " Final   09/20/2020 3.9 3.4 - 5.3 mmol/L Final     Chloride   Date Value Ref Range Status   10/19/2021 103 94 - 109 mmol/L Final   09/20/2020 104 94 - 109 mmol/L Final     Carbon Dioxide   Date Value Ref Range Status   09/20/2020 25 20 - 32 mmol/L Final     Carbon Dioxide (CO2)   Date Value Ref Range Status   10/19/2021 23 20 - 32 mmol/L Final     Anion Gap   Date Value Ref Range Status   10/19/2021 6 3 - 14 mmol/L Final   09/20/2020 7 3 - 14 mmol/L Final     Glucose   Date Value Ref Range Status   10/19/2021 85 70 - 99 mg/dL Final   09/20/2020 118 (H) 70 - 99 mg/dL Final     Urea Nitrogen   Date Value Ref Range Status   10/19/2021 18 7 - 30 mg/dL Final   09/20/2020 25 7 - 30 mg/dL Final     Creatinine   Date Value Ref Range Status   10/19/2021 0.59 0.52 - 1.04 mg/dL Final   09/20/2020 0.88 0.52 - 1.04 mg/dL Final     GFR Estimate   Date Value Ref Range Status   10/19/2021 84 >60 mL/min/1.73m2 Final     Comment:     As of July 11, 2021, eGFR is calculated by the CKD-EPI creatinine equation, without race adjustment. eGFR can be influenced by muscle mass, exercise, and diet. The reported eGFR is an estimation only and is only applicable if the renal function is stable.   09/20/2020 61 >60 mL/min/[1.73_m2] Final     Comment:     Non  GFR Calc  Starting 12/18/2018, serum creatinine based estimated GFR (eGFR) will be   calculated using the Chronic Kidney Disease Epidemiology Collaboration   (CKD-EPI) equation.       Calcium   Date Value Ref Range Status   10/19/2021 8.0 (L) 8.5 - 10.1 mg/dL Final   09/20/2020 9.6 8.5 - 10.1 mg/dL Final     Bilirubin Total   Date Value Ref Range Status   10/19/2021 0.4 0.2 - 1.3 mg/dL Final   11/23/2007 0.7 0.2 - 1.3 mg/dL Final     Alkaline Phosphatase   Date Value Ref Range Status   10/19/2021 414 (H) 40 - 150 U/L Final   11/23/2007 88 40 - 150 U/L Final     ALT   Date Value Ref Range Status   10/19/2021 95 (H) 0 - 50 U/L Final   11/23/2007 28 0 - 50 U/L Final     AST    Date Value Ref Range Status   10/19/2021 130 (H) 0 - 45 U/L Final   11/23/2007 24 0 - 45 U/L Final         Lab Results   Component Value Date    WBC 7.3 10/08/2021    WBC 10.8 09/20/2020     Lab Results   Component Value Date    RBC 3.47 10/08/2021    RBC 4.02 09/20/2020     Lab Results   Component Value Date    HGB 10.7 10/08/2021    HGB 12.1 09/20/2020     Lab Results   Component Value Date    HCT 34.7 10/08/2021    HCT 38.7 09/20/2020     No components found for: MCT  Lab Results   Component Value Date     10/08/2021    MCV 96 09/20/2020     Lab Results   Component Value Date    MCH 30.8 10/08/2021    MCH 30.1 09/20/2020     Lab Results   Component Value Date    MCHC 30.8 10/08/2021    MCHC 31.3 09/20/2020     Lab Results   Component Value Date    RDW 17.2 10/08/2021    RDW 13.2 09/20/2020     Lab Results   Component Value Date     10/08/2021     09/20/2020       Ref Range & Units 1 d ago    GGT 0 - 40 U/L 194High             DISCHARGE PLAN:    Follow up labs: follow up CMP Monday 10/25/21    Medical Follow Up:      Follow up with primary care provider in tomorrow and recommend follow up abdominal ultrasound or CT    Appointment; 10/21 1050am [facility]; PCP [address] [place] [address] [address] [phone] [facility] p/u 10am [phone]      MTM referral needed and placed by this provider: No    Current [place] scheduled appointments:       Discharge Services: Home Care:  Occupational Therapy, Physical Therapy, Registered Nurse, Home Health Aide and  NON-[place]               TOTAL DISCHARGE TIME:   Greater than 30 minutes  Electronically signed by:  PRASHANT Leonard CNP     Home care Face to Face documentation done in [facility] attached to Home care orders for [place] homecare.                       Sincerely,        PRASHANT Leonard CNP

## 2021-10-21 ENCOUNTER — LAB REQUISITION (OUTPATIENT)
Dept: LAB | Facility: CLINIC | Age: 86
End: 2021-10-21

## 2021-10-21 DIAGNOSIS — U07.1 COVID-19: ICD-10-CM

## 2021-10-21 PROCEDURE — U0003 INFECTIOUS AGENT DETECTION BY NUCLEIC ACID (DNA OR RNA); SEVERE ACUTE RESPIRATORY SYNDROME CORONAVIRUS 2 (SARS-COV-2) (CORONAVIRUS DISEASE [COVID-19]), AMPLIFIED PROBE TECHNIQUE, MAKING USE OF HIGH THROUGHPUT TECHNOLOGIES AS DESCRIBED BY CMS-2020-01-R: HCPCS | Performed by: NURSE PRACTITIONER

## 2021-10-22 ENCOUNTER — DOCUMENTATION ONLY (OUTPATIENT)
Dept: GERIATRICS | Facility: CLINIC | Age: 86
End: 2021-10-22

## 2021-10-22 DIAGNOSIS — M62.81 GENERALIZED MUSCLE WEAKNESS: ICD-10-CM

## 2021-10-22 DIAGNOSIS — R53.81 PHYSICAL DECONDITIONING: Primary | ICD-10-CM

## 2021-10-22 DIAGNOSIS — E43 SEVERE MALNUTRITION (H): ICD-10-CM

## 2021-10-22 DIAGNOSIS — R74.8 ELEVATED LIVER ENZYMES: ICD-10-CM

## 2021-10-22 DIAGNOSIS — R79.89 ELEVATED LACTIC ACID LEVEL: ICD-10-CM

## 2021-10-24 LAB — SARS-COV-2 RNA RESP QL NAA+PROBE: NOT DETECTED
